# Patient Record
Sex: MALE | Race: WHITE | NOT HISPANIC OR LATINO
[De-identification: names, ages, dates, MRNs, and addresses within clinical notes are randomized per-mention and may not be internally consistent; named-entity substitution may affect disease eponyms.]

---

## 2018-07-16 ENCOUNTER — APPOINTMENT (OUTPATIENT)
Dept: OPHTHALMOLOGY | Facility: CLINIC | Age: 28
End: 2018-07-16
Payer: COMMERCIAL

## 2018-07-16 PROCEDURE — 92004 COMPRE OPH EXAM NEW PT 1/>: CPT

## 2018-07-16 PROCEDURE — 92285 EXTERNAL OCULAR PHOTOGRAPHY: CPT

## 2021-02-06 ENCOUNTER — INPATIENT (INPATIENT)
Facility: HOSPITAL | Age: 31
LOS: 4 days | Discharge: HOME | DRG: 896 | End: 2021-02-11
Attending: INTERNAL MEDICINE | Admitting: INTERNAL MEDICINE
Payer: COMMERCIAL

## 2021-02-06 VITALS
HEIGHT: 78 IN | SYSTOLIC BLOOD PRESSURE: 144 MMHG | RESPIRATION RATE: 16 BRPM | OXYGEN SATURATION: 98 % | WEIGHT: 199.96 LBS | TEMPERATURE: 99 F | HEART RATE: 96 BPM | DIASTOLIC BLOOD PRESSURE: 89 MMHG

## 2021-02-06 DIAGNOSIS — R41.82 ALTERED MENTAL STATUS, UNSPECIFIED: ICD-10-CM

## 2021-02-06 DIAGNOSIS — G93.40 ENCEPHALOPATHY, UNSPECIFIED: ICD-10-CM

## 2021-02-06 DIAGNOSIS — M51.26 OTHER INTERVERTEBRAL DISC DISPLACEMENT, LUMBAR REGION: Chronic | ICD-10-CM

## 2021-02-06 LAB
ALBUMIN SERPL ELPH-MCNC: 5.2 G/DL — HIGH (ref 3.3–5)
ALP SERPL-CCNC: 64 U/L — SIGNIFICANT CHANGE UP (ref 40–120)
ALT FLD-CCNC: 85 U/L — HIGH (ref 10–45)
AMPHET UR-MCNC: NEGATIVE — SIGNIFICANT CHANGE UP
ANION GAP SERPL CALC-SCNC: 19 MMOL/L — HIGH (ref 5–17)
APAP SERPL-MCNC: <15 UG/ML — SIGNIFICANT CHANGE UP (ref 10–30)
APPEARANCE CSF: CLEAR — SIGNIFICANT CHANGE UP
APPEARANCE SPUN FLD: COLORLESS — SIGNIFICANT CHANGE UP
APPEARANCE UR: CLEAR — SIGNIFICANT CHANGE UP
AST SERPL-CCNC: 37 U/L — SIGNIFICANT CHANGE UP (ref 10–40)
BARBITURATES UR SCN-MCNC: NEGATIVE — SIGNIFICANT CHANGE UP
BASOPHILS # BLD AUTO: 0.03 K/UL — SIGNIFICANT CHANGE UP (ref 0–0.2)
BASOPHILS NFR BLD AUTO: 0.3 % — SIGNIFICANT CHANGE UP (ref 0–2)
BENZODIAZ UR-MCNC: NEGATIVE — SIGNIFICANT CHANGE UP
BILIRUB SERPL-MCNC: 0.8 MG/DL — SIGNIFICANT CHANGE UP (ref 0.2–1.2)
BILIRUB UR-MCNC: NEGATIVE — SIGNIFICANT CHANGE UP
BUN SERPL-MCNC: 20 MG/DL — SIGNIFICANT CHANGE UP (ref 7–23)
CALCIUM SERPL-MCNC: 10.4 MG/DL — SIGNIFICANT CHANGE UP (ref 8.4–10.5)
CHLORIDE SERPL-SCNC: 102 MMOL/L — SIGNIFICANT CHANGE UP (ref 96–108)
CO2 SERPL-SCNC: 21 MMOL/L — LOW (ref 22–31)
COCAINE METAB.OTHER UR-MCNC: NEGATIVE — SIGNIFICANT CHANGE UP
COLOR CSF: SIGNIFICANT CHANGE UP
COLOR SPEC: YELLOW — SIGNIFICANT CHANGE UP
CREAT SERPL-MCNC: 0.87 MG/DL — SIGNIFICANT CHANGE UP (ref 0.5–1.3)
CSF PCR RESULT: SIGNIFICANT CHANGE UP
DIFF PNL FLD: NEGATIVE — SIGNIFICANT CHANGE UP
EOSINOPHIL # BLD AUTO: 0 K/UL — SIGNIFICANT CHANGE UP (ref 0–0.5)
EOSINOPHIL NFR BLD AUTO: 0 % — SIGNIFICANT CHANGE UP (ref 0–6)
ETHANOL SERPL-MCNC: SIGNIFICANT CHANGE UP MG/DL (ref 0–10)
GLUCOSE CSF-MCNC: 79 MG/DL — HIGH (ref 40–70)
GLUCOSE SERPL-MCNC: 116 MG/DL — HIGH (ref 70–99)
GLUCOSE UR QL: NEGATIVE — SIGNIFICANT CHANGE UP
GRAM STN FLD: SIGNIFICANT CHANGE UP
HCT VFR BLD CALC: 45.1 % — SIGNIFICANT CHANGE UP (ref 39–50)
HGB BLD-MCNC: 15.9 G/DL — SIGNIFICANT CHANGE UP (ref 13–17)
IMM GRANULOCYTES NFR BLD AUTO: 0.3 % — SIGNIFICANT CHANGE UP (ref 0–1.5)
KETONES UR-MCNC: ABNORMAL
LABORATORY COMMENT REPORT: SIGNIFICANT CHANGE UP
LDH CSF L TO P-CCNC: 19 U/L — SIGNIFICANT CHANGE UP
LDH FLD-CCNC: 19 U/L — SIGNIFICANT CHANGE UP
LEUKOCYTE ESTERASE UR-ACNC: NEGATIVE — SIGNIFICANT CHANGE UP
LYMPHOCYTES # BLD AUTO: 1.09 K/UL — SIGNIFICANT CHANGE UP (ref 1–3.3)
LYMPHOCYTES # BLD AUTO: 12.3 % — LOW (ref 13–44)
LYMPHOCYTES # CSF: 57 % — SIGNIFICANT CHANGE UP (ref 40–80)
MCHC RBC-ENTMCNC: 29.1 PG — SIGNIFICANT CHANGE UP (ref 27–34)
MCHC RBC-ENTMCNC: 35.3 GM/DL — SIGNIFICANT CHANGE UP (ref 32–36)
MCV RBC AUTO: 82.6 FL — SIGNIFICANT CHANGE UP (ref 80–100)
METHADONE UR-MCNC: NEGATIVE — SIGNIFICANT CHANGE UP
MONOCYTES # BLD AUTO: 0.71 K/UL — SIGNIFICANT CHANGE UP (ref 0–0.9)
MONOCYTES NFR BLD AUTO: 8 % — SIGNIFICANT CHANGE UP (ref 2–14)
MONOS+MACROS NFR CSF: 42 % — SIGNIFICANT CHANGE UP (ref 15–45)
NEUTROPHILS # BLD AUTO: 7.03 K/UL — SIGNIFICANT CHANGE UP (ref 1.8–7.4)
NEUTROPHILS # CSF: 1 % — SIGNIFICANT CHANGE UP (ref 0–6)
NEUTROPHILS NFR BLD AUTO: 79.1 % — HIGH (ref 43–77)
NITRITE UR-MCNC: NEGATIVE — SIGNIFICANT CHANGE UP
NRBC # BLD: 0 /100 WBCS — SIGNIFICANT CHANGE UP (ref 0–0)
NRBC NFR CSF: 2 /UL — SIGNIFICANT CHANGE UP (ref 0–5)
OPIATES UR-MCNC: POSITIVE
OXYCODONE UR-MCNC: NEGATIVE — SIGNIFICANT CHANGE UP
PCP SPEC-MCNC: SIGNIFICANT CHANGE UP
PCP UR-MCNC: NEGATIVE — SIGNIFICANT CHANGE UP
PH UR: 6.5 — SIGNIFICANT CHANGE UP (ref 5–8)
PLATELET # BLD AUTO: 294 K/UL — SIGNIFICANT CHANGE UP (ref 150–400)
POTASSIUM SERPL-MCNC: 3.7 MMOL/L — SIGNIFICANT CHANGE UP (ref 3.5–5.3)
POTASSIUM SERPL-SCNC: 3.7 MMOL/L — SIGNIFICANT CHANGE UP (ref 3.5–5.3)
PROT CSF-MCNC: 34 MG/DL — SIGNIFICANT CHANGE UP (ref 15–45)
PROT SERPL-MCNC: 8.5 G/DL — HIGH (ref 6–8.3)
PROT UR-MCNC: 100 — SIGNIFICANT CHANGE UP
RBC # BLD: 5.46 M/UL — SIGNIFICANT CHANGE UP (ref 4.2–5.8)
RBC # CSF: 0 /UL — SIGNIFICANT CHANGE UP (ref 0–0)
RBC # FLD: 12.8 % — SIGNIFICANT CHANGE UP (ref 10.3–14.5)
SALICYLATES SERPL-MCNC: <2 MG/DL — LOW (ref 15–30)
SARS-COV-2 RNA SPEC QL NAA+PROBE: SIGNIFICANT CHANGE UP
SODIUM SERPL-SCNC: 142 MMOL/L — SIGNIFICANT CHANGE UP (ref 135–145)
SOURCE HSV 1/2: SIGNIFICANT CHANGE UP
SP GR SPEC: 1.04 — HIGH (ref 1.01–1.02)
SPECIMEN SOURCE: SIGNIFICANT CHANGE UP
THC UR QL: POSITIVE
TSH SERPL-MCNC: 1.05 UIU/ML — SIGNIFICANT CHANGE UP (ref 0.27–4.2)
TUBE TYPE: SIGNIFICANT CHANGE UP
UROBILINOGEN FLD QL: ABNORMAL
WBC # BLD: 8.89 K/UL — SIGNIFICANT CHANGE UP (ref 3.8–10.5)
WBC # FLD AUTO: 8.89 K/UL — SIGNIFICANT CHANGE UP (ref 3.8–10.5)

## 2021-02-06 PROCEDURE — 71045 X-RAY EXAM CHEST 1 VIEW: CPT | Mod: 26

## 2021-02-06 PROCEDURE — 62270 DX LMBR SPI PNXR: CPT

## 2021-02-06 PROCEDURE — 99285 EMERGENCY DEPT VISIT HI MDM: CPT | Mod: 25

## 2021-02-06 PROCEDURE — 70450 CT HEAD/BRAIN W/O DYE: CPT | Mod: 26,MA

## 2021-02-06 RX ORDER — CEFTRIAXONE 500 MG/1
2000 INJECTION, POWDER, FOR SOLUTION INTRAMUSCULAR; INTRAVENOUS ONCE
Refills: 0 | Status: COMPLETED | OUTPATIENT
Start: 2021-02-06 | End: 2021-02-06

## 2021-02-06 RX ORDER — VANCOMYCIN HCL 1 G
1000 VIAL (EA) INTRAVENOUS ONCE
Refills: 0 | Status: COMPLETED | OUTPATIENT
Start: 2021-02-06 | End: 2021-02-06

## 2021-02-06 RX ORDER — ONDANSETRON 8 MG/1
4 TABLET, FILM COATED ORAL ONCE
Refills: 0 | Status: COMPLETED | OUTPATIENT
Start: 2021-02-06 | End: 2021-02-06

## 2021-02-06 RX ORDER — SODIUM CHLORIDE 9 MG/ML
1000 INJECTION INTRAMUSCULAR; INTRAVENOUS; SUBCUTANEOUS ONCE
Refills: 0 | Status: COMPLETED | OUTPATIENT
Start: 2021-02-06 | End: 2021-02-06

## 2021-02-06 RX ORDER — ACYCLOVIR SODIUM 500 MG
800 VIAL (EA) INTRAVENOUS ONCE
Refills: 0 | Status: COMPLETED | OUTPATIENT
Start: 2021-02-06 | End: 2021-02-06

## 2021-02-06 RX ORDER — BUPRENORPHINE AND NALOXONE 2; .5 MG/1; MG/1
1 TABLET SUBLINGUAL ONCE
Refills: 0 | Status: DISCONTINUED | OUTPATIENT
Start: 2021-02-06 | End: 2021-02-06

## 2021-02-06 RX ORDER — BUPRENORPHINE AND NALOXONE 2; .5 MG/1; MG/1
2 TABLET SUBLINGUAL ONCE
Refills: 0 | Status: DISCONTINUED | OUTPATIENT
Start: 2021-02-06 | End: 2021-02-06

## 2021-02-06 RX ORDER — ACETAMINOPHEN 500 MG
1000 TABLET ORAL ONCE
Refills: 0 | Status: COMPLETED | OUTPATIENT
Start: 2021-02-06 | End: 2021-02-06

## 2021-02-06 RX ADMIN — SODIUM CHLORIDE 1000 MILLILITER(S): 9 INJECTION INTRAMUSCULAR; INTRAVENOUS; SUBCUTANEOUS at 08:44

## 2021-02-06 RX ADMIN — SODIUM CHLORIDE 1000 MILLILITER(S): 9 INJECTION INTRAMUSCULAR; INTRAVENOUS; SUBCUTANEOUS at 10:18

## 2021-02-06 RX ADMIN — BUPRENORPHINE AND NALOXONE 2 TABLET(S): 2; .5 TABLET SUBLINGUAL at 12:40

## 2021-02-06 RX ADMIN — SODIUM CHLORIDE 1000 MILLILITER(S): 9 INJECTION INTRAMUSCULAR; INTRAVENOUS; SUBCUTANEOUS at 11:44

## 2021-02-06 RX ADMIN — Medication 0.1 MILLIGRAM(S): at 07:25

## 2021-02-06 RX ADMIN — SODIUM CHLORIDE 1000 MILLILITER(S): 9 INJECTION INTRAMUSCULAR; INTRAVENOUS; SUBCUTANEOUS at 19:30

## 2021-02-06 RX ADMIN — ONDANSETRON 4 MILLIGRAM(S): 8 TABLET, FILM COATED ORAL at 07:25

## 2021-02-06 RX ADMIN — SODIUM CHLORIDE 1000 MILLILITER(S): 9 INJECTION INTRAMUSCULAR; INTRAVENOUS; SUBCUTANEOUS at 14:54

## 2021-02-06 RX ADMIN — Medication 266 MILLIGRAM(S): at 19:55

## 2021-02-06 RX ADMIN — SODIUM CHLORIDE 1000 MILLILITER(S): 9 INJECTION INTRAMUSCULAR; INTRAVENOUS; SUBCUTANEOUS at 07:25

## 2021-02-06 RX ADMIN — BUPRENORPHINE AND NALOXONE 1 TABLET(S): 2; .5 TABLET SUBLINGUAL at 07:25

## 2021-02-06 RX ADMIN — Medication 250 MILLIGRAM(S): at 18:35

## 2021-02-06 RX ADMIN — BUPRENORPHINE AND NALOXONE 2 TABLET(S): 2; .5 TABLET SUBLINGUAL at 10:39

## 2021-02-06 RX ADMIN — ONDANSETRON 4 MILLIGRAM(S): 8 TABLET, FILM COATED ORAL at 10:18

## 2021-02-06 RX ADMIN — CEFTRIAXONE 100 MILLIGRAM(S): 500 INJECTION, POWDER, FOR SOLUTION INTRAMUSCULAR; INTRAVENOUS at 14:54

## 2021-02-06 RX ADMIN — Medication 400 MILLIGRAM(S): at 19:47

## 2021-02-06 RX ADMIN — BUPRENORPHINE AND NALOXONE 2 TABLET(S): 2; .5 TABLET SUBLINGUAL at 14:54

## 2021-02-06 NOTE — ED PROVIDER NOTE - CARE PLAN
Principal Discharge DX:	Opioid withdrawal   Principal Discharge DX:	Acute encephalopathy  Secondary Diagnosis:	Fever, unspecified fever cause

## 2021-02-06 NOTE — CONSULT NOTE ADULT - SUBJECTIVE AND OBJECTIVE BOX
MEDICAL TOXICOLOGY CONSULT    HPI: 30yo M pmhx opioid use disorder p/w multiple complaints including intermittent SOB, nausea/vomiting and intermittent memory issues, pt. reports last opioid use was "a couple of days ago" per primary team, patient concerned for infection with COVID-19 but recently tested negative. Toxicology initially consulted for opioid withdrawal s/p 2mg buprenorphine with little improvement, however after escalating treatment with suboxone patient remains symptomatic and intermittently confused. Now with rectal temp to 101.      PAST MEDICAL & SURGICAL HISTORY:  Polysubstance (including opioids) dependence with physiological dependence    L4-L5 disc bulge        MEDICATION HISTORY:  acyclovir IVPB 800 milliGRAM(s) IV Intermittent once  vancomycin  IVPB. 1000 milliGRAM(s) IV Intermittent once      FAMILY HISTORY:      REVIEW OF SYSTEMS: Nausea and vomiting.      Vital Signs Last 24 Hrs  T(C): 38.3 (06 Feb 2021 14:30), Max: 38.3 (06 Feb 2021 14:30)  T(F): 101 (06 Feb 2021 14:30), Max: 101 (06 Feb 2021 14:30)  HR: 64 (06 Feb 2021 14:54) (64 - 98)  BP: 134/88 (06 Feb 2021 14:54) (122/71 - 144/89)  BP(mean): --  RR: 16 (06 Feb 2021 14:54) (16 - 19)  SpO2: 97% (06 Feb 2021 14:54) (96% - 100%)    SIGNIFICANT LABORATORY STUDIES:                        15.9   8.89  )-----------( 294      ( 06 Feb 2021 07:32 )             45.1       02-06    142  |  102  |  20  ----------------------------<  116<H>  3.7   |  21<L>  |  0.87    Ca    10.4      06 Feb 2021 07:32    TPro  8.5<H>  /  Alb  5.2<H>  /  TBili  0.8  /  DBili  x   /  AST  37  /  ALT  85<H>  /  AlkPhos  64  02-06        Anion Gap: -- 02-06 @ 10:37  CK: -- 02-06 @ 10:37  Troponin:  --  02-06 @ 10:37  Pro-BNP:  --  02-06 @ 10:37  VBG:  --  02-06 @ 10:37  Carboxyhemoglobin %:  --  02-06 @ 10:37  Methemoglobin %:  --  02-06 @ 10:37  Osmolality Serum:  --  02-06 @ 10:37  Aspirin Level: <2.0<L>  02-06 @ 10:37  Acetaminophen Level:  <15  02-06 @ 10:37  Ethanol Level:  --  02-06 @ 10:37  Digoxin Level:  --  02-06 @ 10:37  Phenytoin Level:  --  02-06 @ 10:37  Carbamazepine level:  --  02-06 @ 10:37  Lamotrigine level:  --  02-06 @ 10:37  Anion Gap: 19<H> 02-06 @ 07:32  CK: -- 02-06 @ 07:32  Troponin:  --  02-06 @ 07:32  Pro-BNP:  --  02-06 @ 07:32  VBG:  --  02-06 @ 07:32  Carboxyhemoglobin %:  --  02-06 @ 07:32  Methemoglobin %:  --  02-06 @ 07:32  Osmolality Serum:  --  02-06 @ 07:32  Aspirin Level: --  02-06 @ 07:32  Acetaminophen Level:  --  02-06 @ 07:32  Ethanol Level:  --  02-06 @ 07:32  Digoxin Level:  --  02-06 @ 07:32  Phenytoin Level:  --  02-06 @ 07:32  Carbamazepine level:  --  02-06 @ 07:32  Lamotrigine level:  --  02-06 @ 07:32

## 2021-02-06 NOTE — ED ADULT NURSE REASSESSMENT NOTE - NS ED NURSE REASSESS COMMENT FT1
Pt is altered MD Farrell aware, pt speaking but does not make sense. MD Farrell aware. pt actively vomiting, zofran given pt cleaned and given new gown. VSS and FS done. Pt placed on cardiac monitor

## 2021-02-06 NOTE — ED ADULT NURSE NOTE - CHIEF COMPLAINT QUOTE
Bilateral soft wrist restraints ordered for patient safety  Danita Nyhan, MD  6:14 AM c/o headaches, intermittent shortness of breath and new onset of short term memory loss; concerned for covid but rapid covid test at  negative yesterday

## 2021-02-06 NOTE — ED PROVIDER NOTE - ATTENDING CONTRIBUTION TO CARE
30y/o M w/ h/o polysubstance abuse p/w short term memory loss with sts consistent with opiate withdrawal, vss, tremulous, gi sts, myalgia, wants a covid test, SW consult, labs, meds, reassess, signed out pending work up.

## 2021-02-06 NOTE — H&P ADULT - HISTORY OF PRESENT ILLNESS
30yo M pmhx opioid use disorder p/w multiple complaints including intermittent SOB, nausea/vomiting and intermittent memory issues, pt. reports last opioid use was "a couple of days ago" per primary team, patient concerned for infection with COVID-19 but recently tested negative. pt. was thought for opioid withdrawal s/p 2mg buprenorphine with little improvement, however after escalating treatment with suboxone patient remains symptomatic and intermittently confused. Now with rectal temp to 101.    d/w mother and sister over phone to obtain history : extensive Hx of poslysubstance abuse , marijuana , cocaine , heroine , he was on subxaone for 2 years and in Dec'2019 has heroine overdose .   family thinks that he still use opioids/ ativan and marijuana , however they didn't find any in his room. he had back surgeries x 2 in past     pt. is lethagic and confused

## 2021-02-06 NOTE — H&P ADULT - ATTENDING COMMENTS
if pt. continue to be confused and having vomiting then may need NGT  consult house ID in am     d/w mother / sister over phone

## 2021-02-06 NOTE — SBIRT NOTE ADULT - NSSBIRTDRGBRIEFINTDET_GEN_A_CORE
Patient endorses snorting 3-4 bags of heroin daily for the past year. He reports past IVDU and polysubstance use, but only snorting heroin for the last year. Patient reports attending outpatient treatment in the past.

## 2021-02-06 NOTE — ED PROVIDER NOTE - PROGRESS NOTE DETAILS
Resident Akira: pt received as a signout  at7a - labs/CT are pending. If labs are non actionable and no new complains, plan to discuss case with SW for any detox options available and dc home. Attending Aria Boyd: d/w tox recommends adding on additional suboxone. Resident Akira: Pt re-examined at 9:45 noted to be vomiting. Answering most of my questions (name, place and date). Appears drowsy and diaphoretic. Denies any pain. EKG, zofran, suboxone, EToh, tylenol and salicylate levels ordered in case pt needs to be admitted. Pt is ok with me sharing all of his health information with his mother including his drug use. Resident Akira: spoke with mom over the phone who is concerned that patient is hallucinating - States that patient had told her some people called him multiple time and told him things that are not true. Mental status changes between minutes where he is telling her things that are not true and then back to baseline. He told her : "my sister-in-law called him and told him that his dad " but he did not, with few other similar incidents. Never had a fever or rash. Has been depressed over past fews, but never expressed any thoughts of harming himself or others.  Pt states he last used heroin 2-3 weeks ago. Denies using any other drugs in past 2 weeks, including alcohol, cocaine, PCP. Pt's mental status changes intermittently. He answers questions appropriately and then stops. Resident Akira: spoke with mom over the phone who is concerned that patient is hallucinating - States that patient had told her some people called him multiple time and told him things that are not true. Mental status changes between minutes where he is telling her things that are not true and then back to baseline. He told her : "my sister-in-law called him and told him that his dad " but he did not, with few other similar incidents. Never had a fever or rash. Has been depressed over past fews, but never expressed any thoughts of harming himself or others.  Pt states he last used heroin 2-3 weeks ago. Denies using any other drugs in past 2 weeks, including alcohol, cocaine, PCP.   Non diaphoretic, PERRLA, denies any pain. Needs to be prompted repeatedly to answer questions - answers questions appropriately however. Resident Hersimran: found to be vomiting again. Pt was sitting up - low risk of aspiration. Discussed case with tox again over the phone - can give another dose of suboxone 4mg. maximum dose of 16mg total. non diaphoretic, no tremors, arousable with verbal prompt endorsed to me. treating opiate w/d but pt still concerning for psychosis so will consult psychiatry -Derrick Boyd MD- Resident Akira: pt is vomiting again. Following commands. Sitting up. Diaphoretic. Will re-dose suboxone. IVF. mental status waxing and waning but now febrile. consider lp for enephalitis. abx. probable admit. not meningitic however. -Derrick Boyd MD- Resident Akira: LP was performed with over the phone consent obtained from mother by Dr. Derrick Boyd. Resident Hersimran: pt is responsive to verbal stimuli, denies any pain. States no complains at this time.

## 2021-02-06 NOTE — SBIRT NOTE ADULT - NSSBIRTDRGPASSREFTXDET_GEN_A_CORE
He reports that he can return to treatment with his former therapist and follow up with his PMD to resume suboxone. Patient reports he will coordinate appointments when discharge date is known. Patient accepted additional resources to bring home.

## 2021-02-06 NOTE — ED PROCEDURE NOTE - CPROC ED INFORMED CONSENT1
pt's mother. rn and res witness/Benefits, risks, and possible complications of procedure explained to patient/caregiver who verbalized understanding and gave verbal consent.

## 2021-02-06 NOTE — CONSULT NOTE ADULT - ASSESSMENT
32yo M pmhx opioid use disorder p/w multiple complaints, toxicology initially consulted via phone for opioid withdrawal s/p 2mg buprenorphine with little improvement, and after escalating treatment with suboxone patient remains symptomatic and intermittently confused -- which is not consistent with acute opioid withdrawal. Now with rectal temp to 101.    - Acute opioid withdrawal much less likely, consider other/infectious causes  - Acute ingestion/other toxicologic etiology also less likely, pt. does not appear to exhibit other signs/symptoms of clear cut toxidrome   - Continue supportive care and close monitoring of mental status  - If patient becomes increasingly hyperthermic, initiate cooling measures and notify the Tox service  - Rest of work up per primary team    Please page our service with any questions/concerns/updates [phone]   Thank you for the consult

## 2021-02-06 NOTE — ED ADULT NURSE NOTE - OBJECTIVE STATEMENT
Patient presented to ED from triage c/o body aches, tremors, sleepiness, confusion, "cloudy". Symptoms started on Tuesday at work where he was falling sleep frequently and waking up confused and "cloudy". Patient did heroine (snoring)  2 days ago. He has been using it for about a year. Patient jittery and slight hands tremors noted on hands in ED, but pleasant and cooperative. Patient presented to ED from triage c/o body aches, tremors, sleepiness, confusion, "cloudy". Symptoms started on Tuesday at work where he was falling sleep frequently and waking up confused and "cloudy". Patient did heroine (snorted)  2 days ago. He has been using it for about a year. Patient jittery and slight hands tremors noted on hands in ED, but pleasant and cooperative.

## 2021-02-06 NOTE — ED PROVIDER NOTE - CLINICAL SUMMARY MEDICAL DECISION MAKING FREE TEXT BOX
32y/o M w/ h/o polysubstance abuse p/w multiple complaints concern for opioid withdrawal. will give clonidine and Suboxone. basic labs, CTH and social work for rehab. 30y/o M w/ h/o polysubstance abuse p/w multiple complaints concern for opioid withdrawal. COWS score 22. Will give clonidine and Suboxone. basic labs, CTH and social work for rehab.

## 2021-02-06 NOTE — ED PROVIDER NOTE - CONSTITUTIONAL, MLM
normal... Very anxious appearing fidgeting cannot sit still, awake, alert, oriented to person, place, time/situation and in no apparent distress.

## 2021-02-06 NOTE — ED PROVIDER NOTE - OBJECTIVE STATEMENT
30y/o M w/ h/o polysubstance abuse p/w short term memory loss. Pt states that he snorts heroin (no IVDA) last time using was 2-3d ago. Now very anxious, believes he had COVID, was tested x2 both negative. When asked about memory loss pt unable to provide pertinent history to question. Denies fevers, chills, chest pain, cough, sob, abdominal pain, back pain, dysuria, numbness, tingling, EtOH.    Collateral obtained from mother states that son has been repeating questions and acting abnormally. He had multiple episodes of nausea and vomiting yesterday.    Mother More [phone]

## 2021-02-06 NOTE — H&P ADULT - NSHPPHYSICALEXAM_GEN_ALL_CORE
pt. seen and examined , lethargic / confused     Vital Signs Last 24 Hrs  T(C): 37.7 (07 Feb 2021 03:00), Max: 38.5 (06 Feb 2021 19:00)  T(F): 99.9 (07 Feb 2021 03:00), Max: 101.3 (06 Feb 2021 19:00)  HR: 62 (07 Feb 2021 01:15) (53 - 98)  BP: 143/89 (07 Feb 2021 01:08) (122/71 - 162/75)  BP(mean): --  RR: 18 (07 Feb 2021 01:08) (15 - 19)  SpO2: 97% (07 Feb 2021 01:08) (96% - 100%)    heent: nc/at   neck: supple, no JVD  Lungs: B/L fair A/E, no w/r/r  heart: s1s2 nml  abd: soft, NABS  ext: no e/c/c, pulses 2+  neuro: aaox0 lethargic and confused

## 2021-02-06 NOTE — H&P ADULT - NSHPLABSRESULTS_GEN_ALL_CORE
15.9   8.89  )-----------( 294      ( 06 Feb 2021 07:32 )             45.1     02-06    142  |  102  |  20  ----------------------------<  116<H>  3.7   |  21<L>  |  0.87    Ca    10.4      06 Feb 2021 07:32    TPro  8.5<H>  /  Alb  5.2<H>  /  TBili  0.8  /  DBili  x   /  AST  37  /  ALT  85<H>  /  AlkPhos  64  02-06

## 2021-02-06 NOTE — ED ADULT NURSE REASSESSMENT NOTE - NS ED NURSE REASSESS COMMENT FT1
Cesar catheter inserted using sterile technique. Second RN Zohreh present to confirm sterility. Bedside drainage to gravity. Stat lock in place. Pt remains lethargic and altered. VSS

## 2021-02-06 NOTE — CHART NOTE - NSCHARTNOTEFT_GEN_A_CORE
EMERGENCY : LMSW consulted by MD Farrell due to plans for patient to be discharged pending lab results and expressing interest in outpatient treatment options for polysubstance use. LMSW reviewed patient's chart. As per chart review patient is a "32y/o M w/ h/o polysubstance abuse p/w short term memory loss. Pt states that he snorts heroin (no IVDA) last time using was 2-3d ago. Now very anxious, believes he had COVID, was tested x2 both negative." LMSW attempted to meet with patient at bedside for assessment and intervention but was informed by RN that patient decompensated and has become altered and began vomiting in ed. As per MD patient to be admitted. Social work team to continue to follow through admission. Social work remains available for any needs.

## 2021-02-06 NOTE — ED ADULT TRIAGE NOTE - CHIEF COMPLAINT QUOTE
c/o headaches, intermittent shortness of breath and new onset of short term memory loss; concerned for covid but rapid covid test at  negative yesterday

## 2021-02-06 NOTE — ED PROCEDURE NOTE - PROCEDURE ADDITIONAL DETAILS
Emergency Department Focused Ultrasound performed at patient's bedside for educational purposes. The study was performed to evaluate  bladder volume only.

## 2021-02-06 NOTE — ED ADULT NURSE REASSESSMENT NOTE - NS ED NURSE REASSESS COMMENT FT1
Report received from Shira AYALA. Patient laying in bed, no visible signs of distress. Waiting for transport.

## 2021-02-06 NOTE — H&P ADULT - NSICDXPASTMEDICALHX_GEN_ALL_CORE_FT
PAST MEDICAL HISTORY:  Polysubstance (including opioids) dependence with physiological dependence

## 2021-02-06 NOTE — ED ADULT NURSE NOTE - NSFALLRSKASSESSTYPE_ED_ALL_ED
01/04/20 0900   Daily Treatment   Symptoms Review Activity Group 0900 - 1000   Affect Sad;Anxious   Mood Anxious   Thought Process Ruminating   Psychosis None   Type of Hallucinations None identified   Symptom Notation Carol reported feeling \"all right..a little tired\". She stated she took a 5 hour nap after group yesterday and struggled to sleep at night - finally fell asleep about 5:00am.   Psychosocial Stressors Academic/Career concerns;Interpersonal conflict;Loss / Grief   Sleep Report Poor   Hours Slept 2-3 hours this AM, 5 hour nap yesterday   Meals Other (comment)  (Salad at 3:00am)   Goal Complete / Activity Carol reported she found a frame for her drawing.   Treatment Plan Continue treatment plan   Patient Response Attentive;Good eye contact   Comment Carol completed symptom rating sheet.   RN Monitoring of Pain, Safety, and Relapse   Safety Concerns Suicidal ideation;Urge to harm self   Types of Safety Concerns SI:9/10; UHS:10/10   Physical Concerns 5/10   Pain Concerns 5/10   Use of Street Drugs or Alcohol No   Taking Medications as Prescribed No  (Did not take AM, switched med to PM)   Number in group: 12      PHILIP Mccabe     Initial (On Arrival)

## 2021-02-07 DIAGNOSIS — F19.20 OTHER PSYCHOACTIVE SUBSTANCE DEPENDENCE, UNCOMPLICATED: ICD-10-CM

## 2021-02-07 DIAGNOSIS — G93.40 ENCEPHALOPATHY, UNSPECIFIED: ICD-10-CM

## 2021-02-07 DIAGNOSIS — R41.0 DISORIENTATION, UNSPECIFIED: ICD-10-CM

## 2021-02-07 DIAGNOSIS — G03.9 MENINGITIS, UNSPECIFIED: ICD-10-CM

## 2021-02-07 LAB
ALBUMIN SERPL ELPH-MCNC: 4.2 G/DL — SIGNIFICANT CHANGE UP (ref 3.3–5)
ALBUMIN SERPL ELPH-MCNC: 4.3 G/DL — SIGNIFICANT CHANGE UP (ref 3.3–5)
ALP SERPL-CCNC: 53 U/L — SIGNIFICANT CHANGE UP (ref 40–120)
ALP SERPL-CCNC: 53 U/L — SIGNIFICANT CHANGE UP (ref 40–120)
ALT FLD-CCNC: 50 U/L — HIGH (ref 10–45)
ALT FLD-CCNC: 50 U/L — HIGH (ref 10–45)
ANION GAP SERPL CALC-SCNC: 11 MMOL/L — SIGNIFICANT CHANGE UP (ref 5–17)
ANION GAP SERPL CALC-SCNC: 13 MMOL/L — SIGNIFICANT CHANGE UP (ref 5–17)
AST SERPL-CCNC: 23 U/L — SIGNIFICANT CHANGE UP (ref 10–40)
AST SERPL-CCNC: 25 U/L — SIGNIFICANT CHANGE UP (ref 10–40)
BILIRUB DIRECT SERPL-MCNC: 0.1 MG/DL — SIGNIFICANT CHANGE UP (ref 0–0.2)
BILIRUB INDIRECT FLD-MCNC: 0.5 MG/DL — SIGNIFICANT CHANGE UP (ref 0.2–1)
BILIRUB SERPL-MCNC: 0.6 MG/DL — SIGNIFICANT CHANGE UP (ref 0.2–1.2)
BILIRUB SERPL-MCNC: 0.6 MG/DL — SIGNIFICANT CHANGE UP (ref 0.2–1.2)
BUN SERPL-MCNC: 10 MG/DL — SIGNIFICANT CHANGE UP (ref 7–23)
BUN SERPL-MCNC: 13 MG/DL — SIGNIFICANT CHANGE UP (ref 7–23)
CALCIUM SERPL-MCNC: 8.9 MG/DL — SIGNIFICANT CHANGE UP (ref 8.4–10.5)
CALCIUM SERPL-MCNC: 9.3 MG/DL — SIGNIFICANT CHANGE UP (ref 8.4–10.5)
CHLORIDE SERPL-SCNC: 105 MMOL/L — SIGNIFICANT CHANGE UP (ref 96–108)
CHLORIDE SERPL-SCNC: 105 MMOL/L — SIGNIFICANT CHANGE UP (ref 96–108)
CO2 SERPL-SCNC: 23 MMOL/L — SIGNIFICANT CHANGE UP (ref 22–31)
CO2 SERPL-SCNC: 25 MMOL/L — SIGNIFICANT CHANGE UP (ref 22–31)
CREAT SERPL-MCNC: 0.83 MG/DL — SIGNIFICANT CHANGE UP (ref 0.5–1.3)
CREAT SERPL-MCNC: 0.83 MG/DL — SIGNIFICANT CHANGE UP (ref 0.5–1.3)
CULTURE RESULTS: NO GROWTH — SIGNIFICANT CHANGE UP
GAS PNL BLDA: SIGNIFICANT CHANGE UP
GLUCOSE SERPL-MCNC: 114 MG/DL — HIGH (ref 70–99)
GLUCOSE SERPL-MCNC: 116 MG/DL — HIGH (ref 70–99)
HCT VFR BLD CALC: 39 % — SIGNIFICANT CHANGE UP (ref 39–50)
HGB BLD-MCNC: 13.8 G/DL — SIGNIFICANT CHANGE UP (ref 13–17)
MAGNESIUM SERPL-MCNC: 2.3 MG/DL — SIGNIFICANT CHANGE UP (ref 1.6–2.6)
MCHC RBC-ENTMCNC: 29.5 PG — SIGNIFICANT CHANGE UP (ref 27–34)
MCHC RBC-ENTMCNC: 35.4 GM/DL — SIGNIFICANT CHANGE UP (ref 32–36)
MCV RBC AUTO: 83.3 FL — SIGNIFICANT CHANGE UP (ref 80–100)
NRBC # BLD: 0 /100 WBCS — SIGNIFICANT CHANGE UP (ref 0–0)
PHOSPHATE SERPL-MCNC: 2.1 MG/DL — LOW (ref 2.5–4.5)
PLATELET # BLD AUTO: 260 K/UL — SIGNIFICANT CHANGE UP (ref 150–400)
POTASSIUM SERPL-MCNC: 2.9 MMOL/L — CRITICAL LOW (ref 3.5–5.3)
POTASSIUM SERPL-MCNC: 3.2 MMOL/L — LOW (ref 3.5–5.3)
POTASSIUM SERPL-SCNC: 2.9 MMOL/L — CRITICAL LOW (ref 3.5–5.3)
POTASSIUM SERPL-SCNC: 3.2 MMOL/L — LOW (ref 3.5–5.3)
PROT SERPL-MCNC: 6.8 G/DL — SIGNIFICANT CHANGE UP (ref 6–8.3)
PROT SERPL-MCNC: 7.3 G/DL — SIGNIFICANT CHANGE UP (ref 6–8.3)
RBC # BLD: 4.68 M/UL — SIGNIFICANT CHANGE UP (ref 4.2–5.8)
RBC # FLD: 12.9 % — SIGNIFICANT CHANGE UP (ref 10.3–14.5)
SODIUM SERPL-SCNC: 141 MMOL/L — SIGNIFICANT CHANGE UP (ref 135–145)
SODIUM SERPL-SCNC: 141 MMOL/L — SIGNIFICANT CHANGE UP (ref 135–145)
SPECIMEN SOURCE: SIGNIFICANT CHANGE UP
WBC # BLD: 8.36 K/UL — SIGNIFICANT CHANGE UP (ref 3.8–10.5)
WBC # FLD AUTO: 8.36 K/UL — SIGNIFICANT CHANGE UP (ref 3.8–10.5)

## 2021-02-07 PROCEDURE — 95720 EEG PHY/QHP EA INCR W/VEEG: CPT

## 2021-02-07 PROCEDURE — 90792 PSYCH DIAG EVAL W/MED SRVCS: CPT

## 2021-02-07 PROCEDURE — 99223 1ST HOSP IP/OBS HIGH 75: CPT

## 2021-02-07 PROCEDURE — 99222 1ST HOSP IP/OBS MODERATE 55: CPT | Mod: GC

## 2021-02-07 RX ORDER — ACETAMINOPHEN 500 MG
650 TABLET ORAL ONCE
Refills: 0 | Status: COMPLETED | OUTPATIENT
Start: 2021-02-07 | End: 2021-02-07

## 2021-02-07 RX ORDER — CLONAZEPAM 1 MG
1 TABLET ORAL ONCE
Refills: 0 | Status: DISCONTINUED | OUTPATIENT
Start: 2021-02-07 | End: 2021-02-07

## 2021-02-07 RX ORDER — PANTOPRAZOLE SODIUM 20 MG/1
40 TABLET, DELAYED RELEASE ORAL EVERY 12 HOURS
Refills: 0 | Status: DISCONTINUED | OUTPATIENT
Start: 2021-02-07 | End: 2021-02-10

## 2021-02-07 RX ORDER — POTASSIUM CHLORIDE 20 MEQ
10 PACKET (EA) ORAL
Refills: 0 | Status: COMPLETED | OUTPATIENT
Start: 2021-02-07 | End: 2021-02-07

## 2021-02-07 RX ORDER — CLONAZEPAM 1 MG
1 TABLET ORAL
Refills: 0 | Status: DISCONTINUED | OUTPATIENT
Start: 2021-02-08 | End: 2021-02-08

## 2021-02-07 RX ORDER — ACYCLOVIR SODIUM 500 MG
800 VIAL (EA) INTRAVENOUS EVERY 8 HOURS
Refills: 0 | Status: DISCONTINUED | OUTPATIENT
Start: 2021-02-07 | End: 2021-02-07

## 2021-02-07 RX ORDER — ONDANSETRON 8 MG/1
4 TABLET, FILM COATED ORAL EVERY 4 HOURS
Refills: 0 | Status: DISCONTINUED | OUTPATIENT
Start: 2021-02-07 | End: 2021-02-11

## 2021-02-07 RX ORDER — CEFTRIAXONE 500 MG/1
2000 INJECTION, POWDER, FOR SOLUTION INTRAMUSCULAR; INTRAVENOUS EVERY 12 HOURS
Refills: 0 | Status: DISCONTINUED | OUTPATIENT
Start: 2021-02-07 | End: 2021-02-07

## 2021-02-07 RX ORDER — SODIUM CHLORIDE 9 MG/ML
1000 INJECTION, SOLUTION INTRAVENOUS
Refills: 0 | Status: DISCONTINUED | OUTPATIENT
Start: 2021-02-07 | End: 2021-02-11

## 2021-02-07 RX ORDER — HEPARIN SODIUM 5000 [USP'U]/ML
5000 INJECTION INTRAVENOUS; SUBCUTANEOUS EVERY 8 HOURS
Refills: 0 | Status: DISCONTINUED | OUTPATIENT
Start: 2021-02-07 | End: 2021-02-11

## 2021-02-07 RX ORDER — VANCOMYCIN HCL 1 G
1000 VIAL (EA) INTRAVENOUS EVERY 12 HOURS
Refills: 0 | Status: DISCONTINUED | OUTPATIENT
Start: 2021-02-07 | End: 2021-02-08

## 2021-02-07 RX ORDER — CEFTRIAXONE 500 MG/1
1000 INJECTION, POWDER, FOR SOLUTION INTRAMUSCULAR; INTRAVENOUS EVERY 24 HOURS
Refills: 0 | Status: DISCONTINUED | OUTPATIENT
Start: 2021-02-08 | End: 2021-02-08

## 2021-02-07 RX ADMIN — Medication 116 MILLIGRAM(S): at 03:59

## 2021-02-07 RX ADMIN — Medication 100 MILLIEQUIVALENT(S): at 14:31

## 2021-02-07 RX ADMIN — Medication 650 MILLIGRAM(S): at 22:52

## 2021-02-07 RX ADMIN — ONDANSETRON 4 MILLIGRAM(S): 8 TABLET, FILM COATED ORAL at 05:15

## 2021-02-07 RX ADMIN — Medication 100 MILLIEQUIVALENT(S): at 23:20

## 2021-02-07 RX ADMIN — Medication 100 MILLIEQUIVALENT(S): at 13:04

## 2021-02-07 RX ADMIN — Medication 250 MILLIGRAM(S): at 18:33

## 2021-02-07 RX ADMIN — PANTOPRAZOLE SODIUM 40 MILLIGRAM(S): 20 TABLET, DELAYED RELEASE ORAL at 18:33

## 2021-02-07 RX ADMIN — HEPARIN SODIUM 5000 UNIT(S): 5000 INJECTION INTRAVENOUS; SUBCUTANEOUS at 05:18

## 2021-02-07 RX ADMIN — Medication 260 MILLIGRAM(S): at 02:17

## 2021-02-07 RX ADMIN — Medication 1 MILLIGRAM(S): at 21:08

## 2021-02-07 RX ADMIN — SODIUM CHLORIDE 100 MILLILITER(S): 9 INJECTION, SOLUTION INTRAVENOUS at 01:28

## 2021-02-07 RX ADMIN — Medication 100 MILLIEQUIVALENT(S): at 15:59

## 2021-02-07 RX ADMIN — HEPARIN SODIUM 5000 UNIT(S): 5000 INJECTION INTRAVENOUS; SUBCUTANEOUS at 14:36

## 2021-02-07 RX ADMIN — Medication 100 MILLIEQUIVALENT(S): at 22:11

## 2021-02-07 RX ADMIN — HEPARIN SODIUM 5000 UNIT(S): 5000 INJECTION INTRAVENOUS; SUBCUTANEOUS at 21:08

## 2021-02-07 RX ADMIN — Medication 2 MILLIGRAM(S): at 18:12

## 2021-02-07 RX ADMIN — PANTOPRAZOLE SODIUM 40 MILLIGRAM(S): 20 TABLET, DELAYED RELEASE ORAL at 05:15

## 2021-02-07 RX ADMIN — Medication 116 MILLIGRAM(S): at 12:36

## 2021-02-07 RX ADMIN — Medication 250 MILLIGRAM(S): at 05:14

## 2021-02-07 RX ADMIN — CEFTRIAXONE 100 MILLIGRAM(S): 500 INJECTION, POWDER, FOR SOLUTION INTRAMUSCULAR; INTRAVENOUS at 03:12

## 2021-02-07 RX ADMIN — SODIUM CHLORIDE 100 MILLILITER(S): 9 INJECTION, SOLUTION INTRAVENOUS at 18:29

## 2021-02-07 NOTE — BEHAVIORAL HEALTH ASSESSMENT NOTE - RISK ASSESSMENT
Unable to determine Suicide Risk to be determined once Patient is able to tolerate a full psych interview

## 2021-02-07 NOTE — BEHAVIORAL HEALTH ASSESSMENT NOTE - OTHER
n/a see HPI subsequent factors not able to determine at this time due to limitation of exam not able to determine at this time due to limitation of exam

## 2021-02-07 NOTE — CHART NOTE - NSCHARTNOTEFT_GEN_A_CORE
Lester Delatorre   MRN: [id number]    Notified by RN patient with temperature 100.6 F. Fever is downtrending from earlier. Pt seen and examined at bedside, unable to obtain ROS due to baseline status.     VITAL SIGNS:  T(C): 38.1 (02-07-21 @ 01:08), Max: 38.5 (02-06-21 @ 19:00)  HR: 53 (02-07-21 @ 01:08) (53 - 98)  BP: 143/89 (02-07-21 @ 01:08) (122/71 - 162/75)  RR: 18 (02-07-21 @ 01:08) (15 - 19)  SpO2: 97% (02-07-21 @ 01:08) (96% - 100%)  Wt(kg): --      MICROBIOLOGY:   BCx x2 from 2/6 pending   UCx from 2/6 pending     Urinalysis (02.06.21 @ 16:40)   pH Urine: 6.5   Glucose Qualitative, Urine: Negative   Blood, Urine: Negative   Color: Yellow   Urine Appearance: Clear   Bilirubin: Negative   Ketone - Urine: Moderate   Specific Gravity: 1.042   Protein, Urine: 100   Urobilinogen: 3 mg/dL   Nitrite: Negative   Leukocyte Esterase Concentration: Negative       RADIOLOGY:  CXR from 2/6- pending     ANTIBIOTICS  Acyclovir   Ceftriaxone   Vancomycin     PHYSICAL EXAM:    Constitutional: NAD  Respiratory: clear lungs bilaterally. No wheezing, rhonchi, or crackles.  Cardiovascular: S1 S2. No murmurs.  Gastrointestinal: BS X4 active. soft. nontender.  Extremities/Vascular: +2 pulses bilaterally. No BLE edema.      ASSESSMENT/PLAN:   HPI:  30 y/o M with PMHx of Polysubstance abuse p/w short term memory loss. Now presents with fever, improved from previous temp in the ED.       IMPRESSION: Fever  -Tylenol and cooling measures prn for pyrexia  -BC x2,- pending, will endorse to the primary team to f/u and manage   - UC- pending, will endorse to the primary team to f/u and manage   - UA- results reviewed   -CXR- 2/6 pending, will endorse to the primary team to f/u and manage   - Continue current antimicrobials and monitor for improvement in vitals   - follow up with primary team in AM   - RN aware of management     Ashia Ortega PA-C   Dept of Medicine   [id number]

## 2021-02-07 NOTE — BEHAVIORAL HEALTH ASSESSMENT NOTE - NSBHCHARTREVIEWLAB_PSY_A_CORE FT
02-07    141  |  105  |  13  ----------------------------<  116<H>  2.9<LL>   |  23  |  0.83    Ca    9.3      07 Feb 2021 11:41    TPro  6.8  /  Alb  4.3  /  TBili  0.6  /  DBili  x   /  AST  23  /  ALT  50<H>  /  AlkPhos  53  02-07

## 2021-02-07 NOTE — CONSULT NOTE ADULT - ASSESSMENT
30yo M pmhx opioid use disorder p/w multiple complaints including intermittent SOB, nausea/vomiting and intermittent memory issues, pt. reports last opioid use was "a couple of days ago" per primary team, patient concerned for infection with COVID-19 but recently tested negative. pt. was thought for opioid withdrawal s/p 2mg buprenorphine with little improvement, however after escalating treatment with suboxone patient remains symptomatic and intermittently confused. Now with rectal temp to 101.  when seen in AMS  LP CSF parameters not s/o infection  UA   CXR egative  Blood cx pending    No further higher fevers  No leucocytosis    ? Fevers from withdrawl  less likely occult infection  No s/s CNS infection on CSF  REc:  1) follow Cx  2) Psych followup on heroin /opioid abuse  monitor for withdrawl  3) For now continue empiric vanco and ceftraioxone pedning cultures  4) If further fevers check CT chest abd pelvis  5) DC acyclovir    Will tailor plan for ID issues  per course,results.Will defer to primary team on management of other issues.  Assessment, plan and recommendations as detailed above were discussed with the medical/primary  team.  Will Follow.  Beeper [phone] [facility] [id number].   Wknd/afterhours/No response-[phone] or Fellow on call

## 2021-02-07 NOTE — CONSULT NOTE ADULT - ASSESSMENT
INCOMPLETE NOTE. Assessment: 30 yo male with a PMHx of polysubstance abuse. Patient was lethargic on admission with T101F. Primary team initially concerned for opioid withdrawal. Gave several doses of Suboxone without improvement. Patient became increasingly lethargic to the point of only grunting to sternal rub and occasionally saying name. Primary team got CTH which showed mild volume loss. Primary team performed LP. CSF glucose 79, CSF protein 34, total nucleated cell count 2. On exam, patient does not open eyes to verbal or noxious stimuli. Copious drooling. Localizes LUE to pain. Grimaces to pain in other extremities. Strongly resists eye opening. Toes down b/l. Normal tone throughout. Expedited EEG due to concern for seizure. Epilepsy attending Dr. Melchor verbally reported concern for seizure due to prelim EEG findings. Recommended onetime dose of Ativan 2MG IVP. Primary team gave Ativan, exam improved significantly. Concerning for withdrawal from unknown substance, possibly complicated by withdrawal seizure.    Recommendations:  [x] s/p Ativan 2MG IVP x1 with improvement in exam.  [] vEEG. Will follow up official read in AM.  [] Tele monitoring.  [] Start Klonopin 1MG PO BID on 02/08.  [] Would not start any antiepileptic medications at this time.  [] Q4HR neuro checks.  [] Rest of care per primary team.    Case discussed with epilepsy attending Dr. Melchor. Case to be discussed with neurology attending Dr. Stack.

## 2021-02-07 NOTE — BEHAVIORAL HEALTH ASSESSMENT NOTE - NSBHCHARTREVIEWINVESTIGATE_PSY_A_CORE FT
PROBLEM LIST  LABS: Opos/RI/GBS positive     1. Abnormal 1TM screen with normal Verifi: 46 XX. Normal level II US.    Doing well, signs and symptoms of labor and of preeclampsia reviewed again. No concerns. Membranes stripped at her request.  Naa Ashley MD        
QTc 450

## 2021-02-07 NOTE — CONSULT NOTE ADULT - ASSESSMENT
31M with PMH opioid use disorder p/w SOB, admitted for AMS, pending meningitis workup. MICU consulted for concern for airway protection.    #Concern for Airway Compromise  - Pt saturating 95% on RA  - Able to respond appropriately to questions  - ABG reviewed  - The patient was deemed not to be a MICU candidate at this time. Please reconsult if needed

## 2021-02-07 NOTE — BEHAVIORAL HEALTH ASSESSMENT NOTE - NSBHCHARTREVIEWVS_PSY_A_CORE FT
T(C): 37.8 (02-07-21 @ 12:43), Max: 38.5 (02-06-21 @ 19:00)  HR: 75 (02-07-21 @ 12:43) (53 - 75)  BP: 147/76 (02-07-21 @ 12:43) (111/77 - 162/75)  RR: 16 (02-07-21 @ 12:43) (15 - 18)  SpO2: 93% (02-07-21 @ 12:43) (93% - 99%)

## 2021-02-07 NOTE — CONSULT NOTE ADULT - SUBJECTIVE AND OBJECTIVE BOX
Patient is a 31y old  Male who presents with a chief complaint of lethargic / vomiting / fever 101 F (07 Feb 2021 13:39)    From HPI" HPI:  32yo M pmhx opioid use disorder p/w multiple complaints including intermittent SOB, nausea/vomiting and intermittent memory issues, pt. reports last opioid use was "a couple of days ago" per primary team, patient concerned for infection with COVID-19 but recently tested negative. pt. was thought for opioid withdrawal s/p 2mg buprenorphine with little improvement, however after escalating treatment with suboxone patient remains symptomatic and intermittently confused. Now with rectal temp to 101.    d/w mother and sister over phone to obtain history : extensive Hx of poslysubstance abuse , marijuana , cocaine , heroine , he was on subxaone for 2 years and in Dec'2019 has heroine overdose .   family thinks that he still use opioids/ ativan and marijuana , however they didn't find any in his room. he had back surgeries x 2 in past     pt. is lethagic and confused  (06 Feb 2021 17:48)  "    Above verified-agree with above unless noted below.      ID consulted     PAST MEDICAL & SURGICAL HISTORY:  Polysubstance (including opioids) dependence with physiological dependence    L4-L5 disc bulge        Social history:     Unable to obtain     FAMILY HISTORY:  - Family history of medical problems in all first degree relatives reviewed.None of these were found to be related to patients current illness.    REVIEW OF SYSTEMS  pt when seen not responding to anything but noxious stimuli  no verbal response       No Known Allergies    Intolerances        Antimicrobials:    acyclovir IVPB 800 milliGRAM(s) IV Intermittent every 8 hours  cefTRIAXone   IVPB 2000 milliGRAM(s) IV Intermittent every 12 hours  vancomycin  IVPB 1000 milliGRAM(s) IV Intermittent every 12 hours      Prior Antimicrobials:  MEDICATIONS  (STANDING):    acyclovir IVPB   116 mL/Hr IV Intermittent (02-07-21 @ 12:36)   116 mL/Hr IV Intermittent (02-07-21 @ 03:59)    acyclovir IVPB   266 mL/Hr IV Intermittent (02-06-21 @ 19:55)    cefTRIAXone   IVPB   100 mL/Hr IV Intermittent (02-07-21 @ 03:12)    cefTRIAXone   IVPB   100 mL/Hr IV Intermittent (02-06-21 @ 14:54)    vancomycin  IVPB   250 mL/Hr IV Intermittent (02-07-21 @ 05:14)    vancomycin  IVPB.   250 mL/Hr IV Intermittent (02-06-21 @ 18:35)      Other medications reviewed  MEDICATIONS  (STANDING):  acyclovir IVPB 800 milliGRAM(s) IV Intermittent every 8 hours  cefTRIAXone   IVPB 2000 milliGRAM(s) IV Intermittent every 12 hours  dextrose 5% + sodium chloride 0.9%. 1000 milliLiter(s) (100 mL/Hr) IV Continuous <Continuous>  heparin   Injectable 5000 Unit(s) SubCutaneous every 8 hours  pantoprazole  Injectable 40 milliGRAM(s) IV Push every 12 hours  potassium chloride  10 mEq/100 mL IVPB 10 milliEquivalent(s) IV Intermittent every 1 hour  vancomycin  IVPB 1000 milliGRAM(s) IV Intermittent every 12 hours        Vital Signs Last 24 Hrs  T(C): 37.8 (07 Feb 2021 12:43), Max: 38.5 (06 Feb 2021 19:00)  T(F): 100 (07 Feb 2021 12:43), Max: 101.3 (06 Feb 2021 19:00)  HR: 75 (07 Feb 2021 12:43) (53 - 75)  BP: 147/76 (07 Feb 2021 12:43) (111/77 - 162/75)  BP(mean): --  RR: 16 (07 Feb 2021 12:43) (15 - 18)  SpO2: 93% (07 Feb 2021 12:43) (93% - 99%)    PHYSICAL EXAM:mental status as above     Constitutional:Comfortable.Awake and alert  No cachexia     Eyes:PERRL .NO discharge or conjunctival injection    ENMT:no sinus tenderness  no oral exam possible    Neck:Supple,No LN,no JVD      Respiratory:Good air entry bilaterally,CTA    Cardiovascular:S1 S2 wnl, No murmurs,rub or gallops    Gastrointestinal:Soft BS(+) no tenderness no masses ,No rebound or guarding    Genitourinary:No CVA tendereness         Extremities:No cyanosis,clubbing or edema.    Vascular:peripheral pulses felt    Neurological:as above         Musculoskeletal:No joint swelling or LOM              Labs:                            13.8   8.36  )-----------( 260      ( 07 Feb 2021 11:41 )             39.0     WBC Count: 8.36 (02-07-21 @ 11:41)  WBC Count: 8.89 (02-06-21 @ 07:32)      02-07    141  |  105  |  13  ----------------------------<  116<H>  2.9<LL>   |  23  |  0.83    Ca    9.3      07 Feb 2021 11:41    TPro  6.8  /  Alb  4.3  /  TBili  0.6  /  DBili  x   /  AST  23  /  ALT  50<H>  /  AlkPhos  53  02-07            Culture - CSF with Gram Stain (collected 06 Feb 2021 20:40)  Source: .CSF CSF  Gram Stain (06 Feb 2021 21:57):    polymorphonuclear leukocytes seen    No organisms seen    by cytocentrifuge      Herpes Simplex Virus 1/2 PCR, CSF (02.06.21 @ 20:23)   Source HSV 1/2: CSF   HSV 1/2 PCR: NotDetec: HSV1/2 PCR assay is a real-time PCR test that detects and differentiates Protein, CSF (02.06.21 @ 15:56)   Protein, CSF: 34 mg/dL Glucose, CSF (02.06.21 @ 15:56)   Glucose, CSF: 79 mg/dL Total Nucleated Cell Count, CSF: 2 /uL (02.06.21 @ 15:56)     THC, Urine Qualitative (02.06.21 @ 16:41)   THC, Urine Qualitative: Positive: TEST REPEATED. Urinalysis (02.06.21 @ 16:40)   Blood, Urine: Negative   Glucose Qualitative, Urine: Negative   pH Urine: 6.5   Color: Yellow   Urine Appearance: Clear   Bilirubin: Negative   Ketone - Urine: Moderate   Specific Gravity: 1.042   Protein, Urine: 100   Urobilinogen: 3 mg/dL   Nitrite: Negative   Leukocyte Esterase Concentration: Negative   COVID-19 PCR . (02.06.21 @ 08:01)   COVID-19 PCR: NotDetec: You can help in the fight against COVID-19. [facility] may contact Imaging studies(films) were independently reviewed.Findings as detailed in report above   < from: Xray Chest 1 View AP/PA (02.06.21 @ 17:21) >    IMPRESSION:    Clear lungs.        < end of copied text >  < from: CT Head No Cont (02.06.21 @ 07:32) >  IMPRESSION:    Mild volume loss, microvascular disease,no acute hemorrhage or midline shift. If symptoms persist consider follow-up head CT or MR if no contraindications.                < end of copied text >

## 2021-02-07 NOTE — BEHAVIORAL HEALTH ASSESSMENT NOTE - SUMMARY
- presentation inconsistent with substance withdrawal   - presentation inconsistent with a primary psychiatric process   - cannot exclude IV drug use given history   - CT head findings of Mild volume loss, microvascular disease inconsistent with young age; could be secondary effects of polysubstance abuse. Would get MRI   - consider ID consult - presentation inconsistent with substance withdrawal   - presentation inconsistent with a primary psychiatric process   - cannot exclude IV drug use given history   - ABG results? repeat?  - CT head findings of Mild volume loss, microvascular disease inconsistent with young age; could be secondary effects of polysubstance abuse. Would get MRI   - consider ID consult - presentation inconsistent with substance withdrawal   - presentation inconsistent with a primary psychiatric process  - mental status with somnolence, sedation - Pt is not on any sedating medication at this time that would explain this presentation   - cannot exclude IV drug use given history   - ABG results? repeat?  - CT head findings of Mild volume loss, microvascular disease inconsistent with young age; could be secondary effects of polysubstance abuse. Would get MRI   - consider ID consult

## 2021-02-07 NOTE — BEHAVIORAL HEALTH ASSESSMENT NOTE - HPI (INCLUDE ILLNESS QUALITY, SEVERITY, DURATION, TIMING, CONTEXT, MODIFYING FACTORS, ASSOCIATED SIGNS AND SYMPTOMS)
30 yo male, single, noncaregiver, domiciled with family, hx of Polysubstance Abuse (heroin - snorts w/ OD in ; THC, cocaine), hx of Suboxone agonist tx x 2 yrs, , otherwise unremarkable past psychiatric history who presented from home for change in cognition /behavior (ie. repeating questions, acting abnormally, short term memory impairment), fever, nausea and vomiting and appeared drowsy and diaphoretic in ED. Pt's mother told ED staff that Patient was confused, told her some people called him multiple times and told him things that are not true (ex:  "my sister-in-law called him and told him that his dad " but he did not, with few other similar incidents") with fluctuating pattern -  minutes elapsing between confusion and then back to baseline. UTOX positive for THC, opiate. s/p spinal tap; Meningitis on differential so he was started on acyclovir / IV vanco/ IV rocephine. Psych consult called for continued AMS    ISTOP Reference #: [id number] (all 50 States ran) no record of Patient; CVM no record of Patient 30 yo male, single, noncaregiver, domiciled with family, hx of Polysubstance Abuse (heroin - snorts w/ OD in ; THC, cocaine), hx of Suboxone agonist tx x 2 yrs, , otherwise unremarkable past psychiatric history who presented from home for change in cognition /behavior (ie. repeating questions, acting abnormally, short term memory impairment), fever, nausea and vomiting and appeared drowsy and diaphoretic in ED. Pt's mother told ED staff that Patient was confused, told her some people called him multiple times and told him things that are not true (ex:  "my sister-in-law called him and told him that his dad " but he did not, with few other similar incidents") with fluctuating pattern -  minutes elapsing between confusion and then back to baseline. UTOX positive for THC, opiate. s/p spinal tap; Meningitis on differential so he was started on acyclovir / IV vanco/ IV rocephine. Psych consult called for continued AMS    EXAM: limited. Patient lying in bed, in deep sleep, drooling, and does not respond to verbal command. Patient has to be shaken for him to stir. He does not open his eyes, movements are slow, and he does not verbally engage. Unable to nod/shake head to questions as well. meds reviewed - he did not get anything sedating any time recently     ISTOP Reference #: [id number] (all 50 States ran) no record of Patient; CVM no record of Patient

## 2021-02-07 NOTE — CONSULT NOTE ADULT - SUBJECTIVE AND OBJECTIVE BOX
Patient is a 31y old  Male who presents with a chief complaint of lethargic / vomiting / fever 101 F (2021 08:24)      HPI:  32yo M pmhx opioid use disorder p/w multiple complaints including intermittent SOB, nausea/vomiting and intermittent memory issues, pt. reports last opioid use was "a couple of days ago" per primary team, patient concerned for infection with COVID-19 but recently tested negative. pt. was thought for opioid withdrawal s/p 2mg buprenorphine with little improvement, however after escalating treatment with suboxone patient remains symptomatic and intermittently confused. Now with rectal temp to 101.    d/w mother and sister over phone to obtain history : extensive Hx of poslysubstance abuse , marijuana , cocaine , heroine , he was on subxaone for 2 years and in Dec'2019 has heroine overdose .   family thinks that he still use opioids/ ativan and marijuana , however they didn't find any in his room. he had back surgeries x 2 in past     pt. is lethagic and confused  (2021 17:48)      PAST MEDICAL & SURGICAL HISTORY:  Polysubstance (including opioids) dependence with physiological dependence    L4-L5 disc bulge              ECHO  FINDINGS:      MEDICATIONS  (STANDING):  acyclovir IVPB 800 milliGRAM(s) IV Intermittent every 8 hours  cefTRIAXone   IVPB 2000 milliGRAM(s) IV Intermittent every 12 hours  dextrose 5% + sodium chloride 0.9%. 1000 milliLiter(s) (100 mL/Hr) IV Continuous <Continuous>  heparin   Injectable 5000 Unit(s) SubCutaneous every 8 hours  pantoprazole  Injectable 40 milliGRAM(s) IV Push every 12 hours  potassium chloride  10 mEq/100 mL IVPB 10 milliEquivalent(s) IV Intermittent every 1 hour  vancomycin  IVPB 1000 milliGRAM(s) IV Intermittent every 12 hours    MEDICATIONS  (PRN):  ondansetron Injectable 4 milliGRAM(s) IV Push every 4 hours PRN Nausea and/or Vomiting      FAMILY HISTORY:          REVIEW OF SYSTEMS:    CONSTITUTIONAL: No weakness, fevers or chills  EYES/ENT: No visual changes;  No vertigo or throat pain   NECK: No pain or stiffness  RESPIRATORY: No cough, wheezing, hemoptysis; No shortness of breath  CARDIOVASCULAR: No chest pain or palpitations  GASTROINTESTINAL: No abdominal or epigastric pain. No nausea, vomiting, or hematemesis; No diarrhea or constipation. No melena or hematochezia.  GENITOURINARY: No dysuria, frequency or hematuria  NEUROLOGICAL: No numbness or weakness  All other review of systems is negative unless indicated above.    SOCIAL HISTORY:    CIGARETTES:    ALCOHOL:  Vital Signs Last 24 Hrs  T(C): 37.8 (2021 12:43), Max: 38.5 (2021 19:00)  T(F): 100 (2021 12:43), Max: 101.3 (2021 19:00)  HR: 75 (2021 12:43) (53 - 75)  BP: 147/76 (2021 12:43) (111/77 - 162/75)  BP(mean): --  RR: 16 (2021 12:43) (15 - 18)  SpO2: 93% (2021 12:43) (93% - 99%)    PHYSICAL EXAM:  Appearance: Normal	  Psychiatry: A & O x 3, Mood & affect appropriate  HEENT:   Normal oral mucosa, PERRL, EOMI	  Lymphatic: No lymphadenopathy  Cardiovascular: Normal S1 S2,RRR, No JVD, No murmurs  Respiratory: Lungs clear to auscultation	  Gastrointestinal:  Soft, Non-tender, + BS	  Skin: No rashes, No ecchymoses, No cyanosis	  Neurologic: Non-focal  Extremities: Normal range of motion, No clubbing, cyanosis or edema  Vascular: Peripheral pulses palpable 2+ bilaterally      ECG:    I&O's Detail    2021 07:01  -  2021 07:00  --------------------------------------------------------  IN:    dextrose 5% + sodium chloride 0.9%: 300 mL    IV PiggyBack: 65 mL    IV PiggyBack: 50 mL    IV PiggyBack: 250 mL  Total IN: 665 mL    OUT:  Total OUT: 0 mL    Total NET: 665 mL      2021 07:01  -  2021 13:39  --------------------------------------------------------  IN:  Total IN: 0 mL    OUT:    Indwelling Catheter - Urethral (mL): 650 mL  Total OUT: 650 mL    Total NET: -650 mL          LABS:                        13.8   8.36  )-----------( 260      ( 2021 11:41 )             39.0     02-07    141  |  105  |  13  ----------------------------<  116<H>  2.9<LL>   |  23  |  0.83    Ca    9.3      2021 11:41    TPro  6.8  /  Alb  4.3  /  TBili  0.6  /  DBili  x   /  AST  23  /  ALT  50<H>  /  AlkPhos  53  -          Urinalysis Basic - ( 2021 16:40 )    Color: Yellow / Appearance: Clear / S.042 / pH: x  Gluc: x / Ketone: Moderate  / Bili: Negative / Urobili: 3 mg/dL   Blood: x / Protein: 100 / Nitrite: Negative   Leuk Esterase: Negative / RBC: x / WBC x   Sq Epi: x / Non Sq Epi: x / Bacteria: x      I&O's Summary    2021 07:01  -  2021 07:00  --------------------------------------------------------  IN: 665 mL / OUT: 0 mL / NET: 665 mL    2021 07:01  -  2021 13:39  --------------------------------------------------------  IN: 0 mL / OUT: 650 mL / NET: -650 mL      BNP  RADIOLOGY & ADDITIONAL STUDIES:

## 2021-02-07 NOTE — CHART NOTE - NSCHARTNOTEFT_GEN_A_CORE
Pt seen at bedside this morning, lethargic, drooling, and feeling warm. Unable to obtain ROS due to mental status.      Vital Signs Last 24 Hrs  T(C): 37.8 (07 Feb 2021 12:43), Max: 38.5 (06 Feb 2021 19:00)  T(F): 100 (07 Feb 2021 12:43), Max: 101.3 (06 Feb 2021 19:00)  HR: 75 (07 Feb 2021 12:43) (53 - 75)  BP: 147/76 (07 Feb 2021 12:43) (111/77 - 162/75)  BP(mean): --  RR: 16 (07 Feb 2021 12:43) (15 - 18)  SpO2: 93% (07 Feb 2021 12:43) (93% - 99%)    Physical Exam:  General: WN/WD NAD  Neurology: A&Ox3, nonfocal, MCCALL x 4  Head:  Normocephalic, atraumatic  Respiratory: CTA B/L  CV: RRR, S1S2, no murmur  Abdominal: Soft, NT, ND no palpable mass  MSK: No edema, + peripheral pulses, FROM all 4 extremity  Labs:                          13.8   8.36  )-----------( 260      ( 07 Feb 2021 11:41 )             39.0     02-07    141  |  105  |  13  ----------------------------<  116<H>  2.9<LL>   |  23  |  0.83    Ca    9.3      07 Feb 2021 11:41    TPro  6.8  /  Alb  4.3  /  TBili  0.6  /  DBili  x   /  AST  23  /  ALT  50<H>  /  AlkPhos  53  02-07        ABG - ( 07 Feb 2021 12:11 )  pH, Arterial: 7.50  pH, Blood: x     /  pCO2: 33    /  pO2: 45    / HCO3: 25    / Base Excess: 2.9   /  SaO2: 83            Assessment & Plan:  32yo M pmhx opioid use disorder p/w multiple complaints including intermittent SOB, nausea/vomiting and intermittent memory issues, pt. reports last opioid use was "a couple of days ago" per primary team,...  >  >  >    Dionna Aguayo PA-C (Medicine PA)  # Pt seen at bedside this morning, lethargic, drooling, and feeling warm. Unable to obtain ROS due to mental status. Answers to his name and says his name out loud when asked, but can not elicit any more information      Vital Signs Last 24 Hrs  T(C): 37.8 (2021 12:43), Max: 38.5 (2021 19:00)  T(F): 100 (2021 12:43), Max: 101.3 (2021 19:00)  HR: 75 (2021 12:43) (53 - 75)  BP: 147/76 (2021 12:43) (111/77 - 162/75)  BP(mean): --  RR: 16 (2021 12:43) (15 - 18)  SpO2: 93% (2021 12:43) (93% - 99%)    Physical Exam:  General: drooling  Neurology: A&O x1  Head:  Normocephalic, atraumatic  CV: RRR, S1S2, no murmur  Neuro: unable to perform full neuro exam, due to pt's mental status. PERRL    Labs:                          13.8   8.36  )-----------( 260      ( 2021 11:41 )             39.0     02-07    141  |  105  |  13  ----------------------------<  116<H>  2.9<LL>   |  23  |  0.83    Ca    9.3      2021 11:41    TPro  6.8  /  Alb  4.3  /  TBili  0.6  /  DBili  x   /  AST  23  /  ALT  50<H>  /  AlkPhos  53  02-07        ABG - ( 2021 12:11 )  pH, Arterial: 7.50  pH, Blood: x     /  pCO2: 33    /  pO2: 45    / HCO3: 25    / Base Excess: 2.9   /  SaO2: 83            Assessment & Plan:  30yo M pmhx opioid use disorder p/w multiple complaints including intermittent SOB, nausea/vomiting and intermittent memory issues, pt. reports last opioid use was "a couple of days ago" per primary team, now with AMS, drooling, and tremors,  > check vital signs  > D/w Dr. Powell, who recommends psych, ID, and neuro consults  > Called ICU consult, awaiting recommendations    Dionna Aguayo PA-C (Medicine PA)  #[id number]    **Addendum 18:48  > Spoke with neurology. Concern for withdrawal vs neuroleptic malignant syndrome.  > Dr. Melchor from EEG recommended Ativan 2 mg IV push. Pt's mental status improved, now able to say his name, , and knows he is at the hospital. States he "does not remember" how he got here  > Dr. Melchor recommends starting Clonazepam at 9pm, then continuing TID through tomorrow.  > Put patient on low risk CIWA, will transfer to tele floor.  > If pt is tachychardic, hypotensive, or hypertensive, transfer to MICU, as per Dr. Melchor  > D/w Dr. Powell, who is in agreement

## 2021-02-07 NOTE — CONSULT NOTE ADULT - SUBJECTIVE AND OBJECTIVE BOX
CARDIOLOGY CONSULT - Dr. Nolen         HPI:  32yo M pmhx opioid use disorder p/w multiple complaints including intermittent SOB, nausea/vomiting and intermittent memory issues, pt. reports last opioid use was "a couple of days ago" per primary team, patient concerned for infection with COVID-19 but recently tested negative.  Patient denies any cardiac testing.  Currently reports improvement w SOB.  Patient denies any chest pain, palpitations, cough, syncope, edema, exertional symptoms, nausea, abdominal pain,chills, or rash.  Denies any history of CAD, MI, valve disease, cardiomyopathy, or congenital heart disease.      PAST MEDICAL & SURGICAL HISTORY:  Polysubstance (including opioids) dependence with physiological dependence    L4-L5 disc bulge            PREVIOUS DIAGNOSTIC TESTING:    [ ] Echocardiogram:  [ ]  Catheterization:  [ ] Stress Test:  	    MEDICATIONS:  Home Medications:      MEDICATIONS  (STANDING):  acyclovir IVPB 800 milliGRAM(s) IV Intermittent every 8 hours  cefTRIAXone   IVPB 2000 milliGRAM(s) IV Intermittent every 12 hours  dextrose 5% + sodium chloride 0.9%. 1000 milliLiter(s) (100 mL/Hr) IV Continuous <Continuous>  heparin   Injectable 5000 Unit(s) SubCutaneous every 8 hours  pantoprazole  Injectable 40 milliGRAM(s) IV Push every 12 hours  vancomycin  IVPB 1000 milliGRAM(s) IV Intermittent every 12 hours      FAMILY HISTORY:      SOCIAL HISTORY:    [ ] Non-smoker  [x ] Smoker  [ ] Alcohol    Allergies    No Known Allergies    Intolerances    	    REVIEW OF SYSTEMS:  CONSTITUTIONAL: No fever, weight loss, or fatigue  EYES: No eye pain, visual disturbances, or discharge  ENMT:  No difficulty hearing, tinnitus, vertigo; No sinus or throat pain  NECK: No pain or stiffness  RESPIRATORY: No cough, wheezing, chills or hemoptysis; + Shortness of Breath  CARDIOVASCULAR: No chest pain, palpitations, passing out, dizziness, or leg swelling  GASTROINTESTINAL: No abdominal or epigastric pain. No nausea, vomiting, or hematemesis; No diarrhea or constipation. No melena or hematochezia.  GENITOURINARY: No dysuria, frequency, hematuria, or incontinence  NEUROLOGICAL: No headaches, memory loss, loss of strength, numbness, or tremors  SKIN: No itching, burning, rashes, or lesions   	    [x ] All others negative	see hpi   [ ] Unable to obtain    PHYSICAL EXAM:  T(C): 38 (02-07-21 @ 05:30), Max: 38.5 (02-06-21 @ 19:00)  HR: 60 (02-07-21 @ 05:30) (53 - 86)  BP: 148/95 (02-07-21 @ 05:30) (132/72 - 162/75)  RR: 18 (02-07-21 @ 05:30) (15 - 18)  SpO2: 94% (02-07-21 @ 05:30) (94% - 100%)  Wt(kg): --  I&O's Summary    06 Feb 2021 07:01  -  07 Feb 2021 07:00  --------------------------------------------------------  IN: 665 mL / OUT: 0 mL / NET: 665 mL        Appearance: Normal	  Psychiatry: A & O x 3, Mood & affect appropriate  HEENT:   Normal oral mucosa, PERRL, EOMI	  Lymphatic: No lymphadenopathy  Cardiovascular: Normal S1 S2,RRR, No JVD, No murmurs  Respiratory: Lungs clear to auscultation	  Gastrointestinal:  Soft, Non-tender, + BS	  Skin: No rashes, No ecchymoses, No cyanosis	  Neurologic: Non-focal  Extremities: Normal range of motion, No clubbing, cyanosis or edema  Vascular: Peripheral pulses palpable 2+ bilaterally    TELEMETRY: off tele  	    ECG:  	NSR without ischemic changes   RADIOLOGY:  Xray Chest 1 View AP/PA (02.06.21 @ 17:21)     FINDINGS:    The lungs are clear. No pleural effusion or pneumothorax.  Normal cardiomediastinal silhouette.  No acute bony pathology.  The visualized portions of the abdomen are unremarkable.    IMPRESSION:    Clear lungs.      CT Head No Cont (02.06.21 @ 07:32)   FINDINGS:  Mild prominence of the sulci and ventricles slightly greater than expected for age. There are a few hemispheric white matter areas of low attenuation which are nonspecific but likely related to sequelae of microvascular disease. There is no intraparenchymal hematoma, mass effect or midline shift. No abnormal extra-axial fluid collections are present. The basal cisterns are patent.    The calvarium is intact. The visualized intraorbital compartments, paranasal sinuses and tympanomastoid cavities appear free of acute disease.    IMPRESSION:    Mild volume loss, microvascular disease,no acute hemorrhage or midline shift. If symptoms persist consider follow-up head CT or MR if no contraindications.    OTHER: 	  	  LABS:	 	    CARDIAC MARKERS:                   15.9   8.89  )-----------( 294      ( 06 Feb 2021 07:32 )             45.1     02-06    142  |  102  |  20  ----------------------------<  116<H>  3.7   |  21<L>  |  0.87    Ca    10.4      06 Feb 2021 07:32    TPro  8.5<H>  /  Alb  5.2<H>  /  TBili  0.8  /  DBili  x   /  AST  37  /  ALT  85<H>  /  AlkPhos  64  02-06      proBNP:   Lipid Profile:   HgA1c:   TSH: Thyroid Stimulating Hormone, Serum: 1.05 uIU/mL (02-06 @ 12:36)

## 2021-02-07 NOTE — CONSULT NOTE ADULT - ATTENDING COMMENTS
I have examined pt and agree with above exam and plan.  31 you male with AMS. Work up in progress. Asked to evaluate because pt was drooling.   On my exam pt is easily arousable and follows simple commands. He can protect his airway.  ? psychiatric disorder.    Pt can protect airway at this time without difficulty.  Pt is not a MICU candidate at this time.
Pt seen and examined, agree with the above assessment and plan by IKE Eugene.  30yo M pmhx opioid use disorder p/w multiple complaints including intermittent SOB, nausea/vomiting and intermittent memory issues    1. Intermittent SOB  -no chf on exam   -CXR with clear lungs  -EKG without ischemic changes  -pos tox screen noted   -check echo to rule out LV or valvular dysfunction     2. AMS  -etiology unclear  -CT head neg  -s/p LP   -low grade fevers noted  -w/u per med/ID    dvt ppx
30 y/o man with hx of substance abuse, p/w AMS, lethargy and hallucinations. Pt today seen by team, is awake and conversational. Pt admits to snorting heroin prior to coming to the hospital. This is his drug of choice, denies any other drug use, although urine tox was also positive for THC.   Pt complains mainly of recent difficulty with short term memory, and symptoms of WD such as pain and nausea. On initial assessment there was concern for possible withdrawal seizure activity for which he was started on Klonopin 1mg BID.   On exam he has myoclonus and whole body tremor, he is awake, fully oriented, speech normal fluent, follows commands, strength intact throughout, CN all intact, reflexes 2+ throughout.     2/8- EEG SUMMARY/CLASSIFICATION  Abnormal EEG in the awake state   - Mild generalized slowing.  _____________________________________________________________  EEG IMPRESSION/CLINICAL CORRELATE  Abnormal EEG study.  1. Mild nonspecific diffuse or multifocal cerebral dysfunction.   2. No epileptiform pattern or seizure seen.  * Limited evaluation.  Very prominent myogenic and movement artifacts were noted. No video recorded.     Pt with hx of heroin abuse p/w encephalopathy.  No seizure activity evident on EEG. Would taper down Klonopin to 0.5mg BID.   Would get MRI brain to evaluate for heroin induced leukoencephalopathy.   Care as per Primary team and Navya.

## 2021-02-07 NOTE — CONSULT NOTE ADULT - ASSESSMENT
a/p  30yo M pmhx opioid use disorder p/w multiple complaints including intermittent SOB, nausea/vomiting and intermittent memory issues    1. Intermittent SOB  -no chf on exam   -CXR with clear lungs  -EKG without ischemic changes  -pos tox screen noted   -check echo to rule out LV or valvular dysfunction     2. AMS  -etiology unclear  -CT head neg  -s/p LP   -low grade fevers noted  -w/u per med/ID    dvt ppx

## 2021-02-07 NOTE — CHART NOTE - NSCHARTNOTEFT_GEN_A_CORE
MEDICINE PA    Notified by RN patient with temperature 99.9 and feels warm. Rectal temp 101.1.   Seen and examined patient at bedside. Patient is here 2/2 encephalopathy w/ AMS likely 2/2 opioid withdrawal on CIWA .   Denies HA, CP, SOB, cough, N/V, or abd pain.     VITAL SIGNS:  T(C): 38.4 (02-07-21 @ 22:31), Max: 38.4 (02-07-21 @ 22:31)  HR: 57 (02-07-21 @ 22:31) (53 - 85)  BP: 147/75 (02-07-21 @ 22:31) (111/77 - 151/84)  RR: 18 (02-07-21 @ 22:31) (16 - 20)  SpO2: 98% (02-07-21 @ 22:31) (93% - 98%)        LABORATORY:                          13.8   8.36  )-----------( 260      ( 07 Feb 2021 11:41 )             39.0       02-07    141  |  105  |  10  ----------------------------<  114<H>  3.2<L>   |  25  |  0.83    Ca    8.9      07 Feb 2021 18:36  Phos  2.1     02-07  Mg     2.3     02-07    TPro  7.3  /  Alb  4.2  /  TBili  0.6  /  DBili  0.1  /  AST  25  /  ALT  50<H>  /  AlkPhos  53  02-07          MICROBIOLOGY:           RADIOLOGY:          PHYSICAL EXAM:    Constitutional: AOx3. NAD.    Respiratory: clear lungs bilaterally. No wheezing, rhonchi, or crackles.    Cardiovascular: S1 S2. No murmurs.    Gastrointestinal: BS X4 active. soft. nontender.    Extremities/Vascular: +2 pulses bilaterally. No BLE edema.      ASSESSMENT/PLAN:   HPI:  32yo M pmhx opioid use disorder p/w multiple complaints including intermittent SOB, nausea/vomiting and intermittent memory issues, pt. reports last opioid use was "a couple of days ago" per primary team, patient concerned for infection with COVID-19 but recently tested negative. pt. was thought for opioid withdrawal s/p 2mg buprenorphine with little improvement, however after escalating treatment with suboxone patient remains symptomatic and intermittently confused. Now with rectal temp to 101.    d/w mother and sister over phone to obtain history : extensive Hx of poslysubstance abuse , marijuana , cocaine , heroine , he was on subxaone for 2 years and in Dec'2019 has heroine overdose .   family thinks that he still use opioids/ ativan and marijuana , however they didn't find any in his room. he had back surgeries x 2 in past     pt. is lethagic and confused  (06 Feb 2021 17:48)        1) Fever  -tylenol and cooling measures prn for pyrexia  -BC x2, UA/UC  -CXR  -c/w   -F/U primary team in AM MEDICINE PA    Notified by RN patient with temperature 99.9 and feels warm. Rectal temp 101.1.   Seen and examined patient at bedside. Patient is here 2/2 encephalopathy w/ AMS likely 2/2 opioid withdrawal on CIWA .   Denies HA, CP, SOB, cough, N/V, or abd pain. Pt is warm to touch.     PHYSICAL EXAM:  Constitutional: AOx2. NAD.  Respiratory: clear lungs bilaterally.   Cardiovascular: S1 S2.   Gastrointestinal: BS X4 active. soft. nontender.  Extremities/Vascular: +2 pulses bilaterally.         VITAL SIGNS:  T(C): 38.4 (02-07-21 @ 22:31), Max: 38.4 (02-07-21 @ 22:31)  HR: 57 (02-07-21 @ 22:31) (53 - 85)  BP: 147/75 (02-07-21 @ 22:31) (111/77 - 151/84)  RR: 18 (02-07-21 @ 22:31) (16 - 20)  SpO2: 98% (02-07-21 @ 22:31) (93% - 98%)      LABORATORY:                          13.8   8.36  )-----------( 260      ( 07 Feb 2021 11:41 )             39.0       02-07    141  |  105  |  10  ----------------------------<  114<H>  3.2<L>   |  25  |  0.83    Ca    8.9      07 Feb 2021 18:36  Phos  2.1     02-07  Mg     2.3     02-07    TPro  7.3  /  Alb  4.2  /  TBili  0.6  /  DBili  0.1  /  AST  25  /  ALT  50<H>  /  AlkPhos  53  02-07        MICROBIOLOGY:     Culture - Urine (02.06.21 @ 20:52)    Specimen Source: .Urine Catheterized    Culture Results:   No growth      RADIOLOGY:  < from: Xray Chest 1 View AP/PA (02.06.21 @ 17:21) >  FINDINGS:  The lungs are clear. No pleural effusion or pneumothorax.  Normal cardiomediastinal silhouette.  No acute bony pathology.  The visualized portions of the abdomen are unremarkable.    IMPRESSION:  Clear lungs.  < end of copied text >        ASSESSMENT/PLAN:   HPI:  30yo M pmhx opioid use disorder p/w multiple complaints including intermittent SOB, nausea/vomiting and intermittent memory issues, pt. reports last opioid use was "a couple of days ago" per primary team, patient concerned for infection with COVID-19 but recently tested negative. pt. was thought for opioid withdrawal s/p 2mg buprenorphine with little improvement, however after escalating treatment with suboxone patient remains symptomatic and intermittently confused. Now with rectal temp to 101.  d/w mother and sister over phone to obtain history : extensive Hx of poslysubstance abuse , marijuana , cocaine , heroine , he was on subxaone for 2 years and in Dec'2019 has heroine overdose .   family thinks that he still use opioids/ ativan and marijuana , however they didn't find any in his room. he had back surgeries x 2 in past   pt. is lethagic and confused  (06 Feb 2021 17:48)        1) Fever 101.1 w/ the rest VS stable likely 2/2 withdrawal   -Tylenol 650po x1 and cooling measures prn for pyrexia  -BC x2 STAT ordered   - CT chest Abd Pelvis w/ IV contrast as per ID   -c/w vanco and ceftrioxone as per ID   -F/U primary team in AM      Karl Rodríguez  Dept of Medicine   #[id number] MEDICINE [facility]    Notified by RN patient with temperature 99.9 and feels warm. Rectal temp 101.1.   Seen and examined patient at bedside. Patient is here 2/2 encephalopathy w/ AMS likely 2/2 opioid withdrawal on CIWA .   Denies HA, CP, SOB, cough, N/V, or abd pain. Pt is warm to touch.     PHYSICAL EXAM:  Constitutional: AOx2. Observed pt and he was talking out loud with his eyes closed   Respiratory: clear lungs bilaterally.   Cardiovascular: S1 S2.   Gastrointestinal: BS X4 active. soft. nontender.  Extremities/Vascular: +2 pulses bilaterally.         VITAL SIGNS:  T(C): 38.4 (21 @ 22:31), Max: 38.4 (21 @ 22:31)  HR: 57 (21 @ :31) (53 - 85)  BP: 147/75 (21 @ 22:31) (111/77 - 151/84)  RR: 18 (21 @ 22:31) (16 - 20)  SpO2: 98% (21 @ 22:31) (93% - 98%)      LABORATORY:                          13.8   8.36  )-----------( 260      ( 2021 11:41 )             39.0       02-    141  |  105  |  10  ----------------------------<  114<H>  3.2<L>   |  25  |  0.83    Ca    8.9      2021 18:36  Phos  2.1     -  Mg     2.3     -    TPro  7.3  /  Alb  4.2  /  TBili  0.6  /  DBili  0.1  /  AST  25  /  ALT  50<H>  /  AlkPhos  53  02-07    Urinalysis Basic - ( 2021 16:40 )    Color: Yellow / Appearance: Clear / S.042 / pH: x  Gluc: x / Ketone: Moderate  / Bili: Negative / Urobili: 3 mg/dL   Blood: x / Protein: 100 / Nitrite: Negative   Leuk Esterase: Negative / RBC: x / WBC x   Sq Epi: x / Non Sq Epi: x / Bacteria: x        MICROBIOLOGY:     Culture - Urine (21 @ 20:52)    Specimen Source: .Urine Catheterized    Culture Results:   No growth      RADIOLOGY:  < from: Xray Chest 1 View AP/PA (21 @ 17:21) >  FINDINGS:  The lungs are clear. No pleural effusion or pneumothorax.  Normal cardiomediastinal silhouette.  No acute bony pathology.  The visualized portions of the abdomen are unremarkable.    IMPRESSION:  Clear lungs.  < end of copied text >        ASSESSMENT/PLAN:   HPI:  32yo M pmhx opioid use disorder p/w multiple complaints including intermittent SOB, nausea/vomiting and intermittent memory issues, pt. reports last opioid use was "a couple of days ago" per primary team, patient concerned for infection with COVID-19 but recently tested negative. pt. was thought for opioid withdrawal s/p 2mg buprenorphine with little improvement, however after escalating treatment with suboxone patient remains symptomatic and intermittently confused. Now with rectal temp to 101.  d/w mother and sister over phone to obtain history : extensive Hx of poslysubstance abuse , marijuana , cocaine , heroine , he was on subxaone for 2 years and in Dec'2019 has heroine overdose .   family thinks that he still use opioids/ ativan and marijuana , however they didn't find any in his room. he had back surgeries x 2 in past   pt. is lethagic and confused  (2021 17:48)        1) Fever 101.1 w/ the rest VS stable likely 2/2 withdrawal   -Tylenol 650po x1 and cooling measures prn for pyrexia  -BC x2 STAT ordered   -CT chest Abd Pelvis as per ID   -c/w vanco and ceftrioxone as per ID   -F/U primary team in AM  - Discussed with Dr. Powell and agreed with the above plan, recommended to have CT done with contrast.       Karl Rodríguez  Dept of Medicine   #[id number]

## 2021-02-07 NOTE — BEHAVIORAL HEALTH ASSESSMENT NOTE - NSBHCHARTREVIEWIMAGING_PSY_A_CORE FT
CT Head No Cont (02.06.21) Mild volume loss, microvascular disease, no acute hemorrhage or midline shift. If symptoms persist consider follow-up head CT or MR if no contraindications.

## 2021-02-07 NOTE — CONSULT NOTE ADULT - SUBJECTIVE AND OBJECTIVE BOX
PAYAL BULLOCK  Male  MRN-[id number]    HPI:  32yo M pmhx opioid use disorder p/w multiple complaints including intermittent SOB, nausea/vomiting and intermittent memory issues, pt. reports last opioid use was "a couple of days ago" per primary team, patient concerned for infection with COVID-19 but recently tested negative. pt. was thought for opioid withdrawal s/p 2mg buprenorphine with little improvement, however after escalating treatment with suboxone patient remains symptomatic and intermittently confused. Now with rectal temp to 101.    d/w mother and sister over phone to obtain history : extensive Hx of poslysubstance abuse , marijuana , cocaine , heroine , he was on subxaone for 2 years and in Dec'2019 has heroine overdose .   family thinks that he still use opioids/ ativan and marijuana , however they didn't find any in his room. he had back surgeries x 2 in past     pt. is lethagic and confused  (06 Feb 2021 17:48)      NIHSS:  MRS:     ROS: All negative except as mentioned in HPI.    PAST MEDICAL & SURGICAL HISTORY:  Polysubstance (including opioids) dependence with physiological dependence    L4-L5 disc bulge      MEDICATIONS  (STANDING):  clonazePAM  Tablet 1 milliGRAM(s) Oral once  dextrose 5% + sodium chloride 0.9%. 1000 milliLiter(s) (100 mL/Hr) IV Continuous <Continuous>  heparin   Injectable 5000 Unit(s) SubCutaneous every 8 hours  pantoprazole  Injectable 40 milliGRAM(s) IV Push every 12 hours  vancomycin  IVPB 1000 milliGRAM(s) IV Intermittent every 12 hours    MEDICATIONS  (PRN):  ondansetron Injectable 4 milliGRAM(s) IV Push every 4 hours PRN Nausea and/or Vomiting    Allergies    No Known Allergies    Intolerances        VITAL SIGNS:  T(F): 100  HR: 70  BP: 136/83  RR: 18  SpO2: 94%    Exam:   MS: Oriented x3. Fluent. Follows crossed commands.   CN: VFF. EOMI. V1-V3 intact. Face symmetric. T/u midline.   Motor: Full strength throughout.   Sensory: Intact to LT throughout   Reflexes: 2+ throughout. Babinski absent bilaterally.   Coordination: No dysmetria on FNF or ataxia on HTS bilaterally   Gait: Deferred    LABS:                          13.8   8.36  )-----------( 260      ( 07 Feb 2021 11:41 )             39.0     02-07    141  |  105  |  10  ----------------------------<  114<H>  3.2<L>   |  25  |  0.83    Ca    8.9      07 Feb 2021 18:36  Phos  2.1     02-07  Mg     2.3     02-07    TPro  7.3  /  Alb  4.2  /  TBili  0.6  /  DBili  0.1  /  AST  25  /  ALT  50<H>  /  AlkPhos  53  02-07        RADIOLOGY:             PAYAL BULLOCK  Male  MRN-[id number]    Subjective: Neurology consulted for persistent AMS. Patient with extensive drug use history. Patient was lethargic on admission with T101F. Primary team initially concerned for opioid withdrawal. Gave several doses of Suboxone without improvement. Patient became increasingly lethargic to the point of only grunting to sternal rub and occasionally saying name. Primary team got CTH which showed mild volume loss. Primary team performed LP. CSF glucose 79, CSF protein 34, total nucleated cell count 2. On exam, patient does not open eyes to verbal or noxious stimuli. Copious drooling. Localizes LUE to pain. Grimaces to pain in other extremities. Strongly resists eye opening. Toes down b/l. Normal tone throughout. Expedited EEG due to concern for seizure. Epilepsy attending Dr. Melchor verbally reported concern for seizure due to prelim EEG findings. Recommended onetime dose of Ativan 2MG IVP. Primary team gave Ativan, exam improved significantly. Patient became more rousable and was able to state name, follow simple commands. Patient transferred to  to be on tele. Will start on Klonopin 1MG PO BID on recommendation of Dr. eMlchor.      Of note, patient has history of febrile seizure at age 3 per family.    HPI: (Per primary team. Patient unable to provide history.)  30yo M pmhx opioid use disorder p/w multiple complaints including intermittent SOB, nausea/vomiting and intermittent memory issues, pt. reports last opioid use was "a couple of days ago" per primary team, patient concerned for infection with COVID-19 but recently tested negative. pt. was thought for opioid withdrawal s/p 2mg buprenorphine with little improvement, however after escalating treatment with suboxone patient remains symptomatic and intermittently confused. Now with rectal temp to 101.    d/w mother and sister over phone to obtain history : extensive Hx of poslysubstance abuse , marijuana , cocaine , heroine , he was on subxaone for 2 years and in Dec'2019 has heroine overdose .   family thinks that he still use opioids/ ativan and marijuana , however they didn't find any in his room. he had back surgeries x 2 in past     pt. is lethagic and confused  (06 Feb 2021 17:48)    ROS: Unable to obtain, patient not rousable to verbal or noxious stimuli.    PAST MEDICAL & SURGICAL HISTORY:  Polysubstance (including opioids) dependence with physiological dependence    L4-L5 disc bulge    MEDICATIONS  (STANDING):  clonazePAM  Tablet 1 milliGRAM(s) Oral once  dextrose 5% + sodium chloride 0.9%. 1000 milliLiter(s) (100 mL/Hr) IV Continuous <Continuous>  heparin   Injectable 5000 Unit(s) SubCutaneous every 8 hours  pantoprazole  Injectable 40 milliGRAM(s) IV Push every 12 hours  vancomycin  IVPB 1000 milliGRAM(s) IV Intermittent every 12 hours    MEDICATIONS  (PRN):  ondansetron Injectable 4 milliGRAM(s) IV Push every 4 hours PRN Nausea and/or Vomiting    Allergies    No Known Allergies    Vital Signs Last 24 Hrs  T(C): 37.7 (08 Feb 2021 00:21), Max: 38.4 (07 Feb 2021 22:31)  T(F): 99.9 (08 Feb 2021 00:21), Max: 101.1 (07 Feb 2021 22:31)  HR: 59 (08 Feb 2021 00:21) (57 - 85)  BP: 131/56 (08 Feb 2021 00:21) (111/77 - 151/84)  RR: 18 (08 Feb 2021 00:21) (16 - 20)  SpO2: 93% (08 Feb 2021 00:21) (93% - 98%)    Exam:   MS: Eyes closed. Do not open to verbal or noxious stimuli. No verbal output. Patient does not follow commands.  CN: Patient resists eye opening by examiner. Can see REM under closed lids. Face grossly symmetric.   Motor: Whole body twitching. LUE localizes to pain. Does not withdraw RUE/RLE/LLE to pain.   Sensory: Grimaces to noxious stimuli x4.  Reflexes: 2+ throughout. Babinski absent bilaterally.   Coordination: Unable to assess.   Gait: Unable to assess.    LABS:               13.8   8.36  )-----------( 260      ( 07 Feb 2021 11:41 )             39.0     02-07    141  |  105  |  10  ----------------------------<  114<H>  3.2<L>   |  25  |  0.83    Ca    8.9      07 Feb 2021 18:36  Phos  2.1     02-07  Mg     2.3     02-07    TPro  7.3  /  Alb  4.2  /  TBili  0.6  /  DBili  0.1  /  AST  25  /  ALT  50<H>  /  AlkPhos  53  02-07    RADIOLOGY:    -02/06 CTH: Mild volume loss, microvascular disease, no acute hemorrhage or midline shift. If symptoms persist consider follow-up head CT or MR if no contraindications.

## 2021-02-08 LAB
ALBUMIN SERPL ELPH-MCNC: 4 G/DL — SIGNIFICANT CHANGE UP (ref 3.3–5)
ALP SERPL-CCNC: 49 U/L — SIGNIFICANT CHANGE UP (ref 40–120)
ALT FLD-CCNC: 41 U/L — SIGNIFICANT CHANGE UP (ref 10–45)
ANION GAP SERPL CALC-SCNC: 11 MMOL/L — SIGNIFICANT CHANGE UP (ref 5–17)
ANION GAP SERPL CALC-SCNC: 12 MMOL/L — SIGNIFICANT CHANGE UP (ref 5–17)
ANION GAP SERPL CALC-SCNC: 12 MMOL/L — SIGNIFICANT CHANGE UP (ref 5–17)
AST SERPL-CCNC: 24 U/L — SIGNIFICANT CHANGE UP (ref 10–40)
BASOPHILS # BLD AUTO: 0.02 K/UL — SIGNIFICANT CHANGE UP (ref 0–0.2)
BASOPHILS NFR BLD AUTO: 0.3 % — SIGNIFICANT CHANGE UP (ref 0–2)
BILIRUB SERPL-MCNC: 0.5 MG/DL — SIGNIFICANT CHANGE UP (ref 0.2–1.2)
BUN SERPL-MCNC: 8 MG/DL — SIGNIFICANT CHANGE UP (ref 7–23)
BUN SERPL-MCNC: 9 MG/DL — SIGNIFICANT CHANGE UP (ref 7–23)
BUN SERPL-MCNC: 9 MG/DL — SIGNIFICANT CHANGE UP (ref 7–23)
CALCIUM SERPL-MCNC: 8.7 MG/DL — SIGNIFICANT CHANGE UP (ref 8.4–10.5)
CALCIUM SERPL-MCNC: 9.2 MG/DL — SIGNIFICANT CHANGE UP (ref 8.4–10.5)
CALCIUM SERPL-MCNC: 9.4 MG/DL — SIGNIFICANT CHANGE UP (ref 8.4–10.5)
CHLORIDE SERPL-SCNC: 105 MMOL/L — SIGNIFICANT CHANGE UP (ref 96–108)
CHLORIDE SERPL-SCNC: 105 MMOL/L — SIGNIFICANT CHANGE UP (ref 96–108)
CHLORIDE SERPL-SCNC: 107 MMOL/L — SIGNIFICANT CHANGE UP (ref 96–108)
CO2 SERPL-SCNC: 22 MMOL/L — SIGNIFICANT CHANGE UP (ref 22–31)
CO2 SERPL-SCNC: 23 MMOL/L — SIGNIFICANT CHANGE UP (ref 22–31)
CO2 SERPL-SCNC: 24 MMOL/L — SIGNIFICANT CHANGE UP (ref 22–31)
CREAT SERPL-MCNC: 0.77 MG/DL — SIGNIFICANT CHANGE UP (ref 0.5–1.3)
CREAT SERPL-MCNC: 0.79 MG/DL — SIGNIFICANT CHANGE UP (ref 0.5–1.3)
CREAT SERPL-MCNC: 0.8 MG/DL — SIGNIFICANT CHANGE UP (ref 0.5–1.3)
EOSINOPHIL # BLD AUTO: 0.02 K/UL — SIGNIFICANT CHANGE UP (ref 0–0.5)
EOSINOPHIL NFR BLD AUTO: 0.3 % — SIGNIFICANT CHANGE UP (ref 0–6)
FOLATE SERPL-MCNC: 16.9 NG/ML — SIGNIFICANT CHANGE UP
GLUCOSE BLDC GLUCOMTR-MCNC: 103 MG/DL — HIGH (ref 70–99)
GLUCOSE SERPL-MCNC: 107 MG/DL — HIGH (ref 70–99)
GLUCOSE SERPL-MCNC: 92 MG/DL — SIGNIFICANT CHANGE UP (ref 70–99)
GLUCOSE SERPL-MCNC: 95 MG/DL — SIGNIFICANT CHANGE UP (ref 70–99)
HCT VFR BLD CALC: 38.6 % — LOW (ref 39–50)
HCT VFR BLD CALC: 40.6 % — SIGNIFICANT CHANGE UP (ref 39–50)
HGB BLD-MCNC: 13.6 G/DL — SIGNIFICANT CHANGE UP (ref 13–17)
HGB BLD-MCNC: 14.4 G/DL — SIGNIFICANT CHANGE UP (ref 13–17)
IMM GRANULOCYTES NFR BLD AUTO: 0.7 % — SIGNIFICANT CHANGE UP (ref 0–1.5)
LYMPHOCYTES # BLD AUTO: 2.05 K/UL — SIGNIFICANT CHANGE UP (ref 1–3.3)
LYMPHOCYTES # BLD AUTO: 27.7 % — SIGNIFICANT CHANGE UP (ref 13–44)
MAGNESIUM SERPL-MCNC: 2.2 MG/DL — SIGNIFICANT CHANGE UP (ref 1.6–2.6)
MAGNESIUM SERPL-MCNC: 2.3 MG/DL — SIGNIFICANT CHANGE UP (ref 1.6–2.6)
MCHC RBC-ENTMCNC: 29.4 PG — SIGNIFICANT CHANGE UP (ref 27–34)
MCHC RBC-ENTMCNC: 29.7 PG — SIGNIFICANT CHANGE UP (ref 27–34)
MCHC RBC-ENTMCNC: 35.2 GM/DL — SIGNIFICANT CHANGE UP (ref 32–36)
MCHC RBC-ENTMCNC: 35.5 GM/DL — SIGNIFICANT CHANGE UP (ref 32–36)
MCV RBC AUTO: 83.5 FL — SIGNIFICANT CHANGE UP (ref 80–100)
MCV RBC AUTO: 83.7 FL — SIGNIFICANT CHANGE UP (ref 80–100)
MONOCYTES # BLD AUTO: 0.71 K/UL — SIGNIFICANT CHANGE UP (ref 0–0.9)
MONOCYTES NFR BLD AUTO: 9.6 % — SIGNIFICANT CHANGE UP (ref 2–14)
NEUTROPHILS # BLD AUTO: 4.55 K/UL — SIGNIFICANT CHANGE UP (ref 1.8–7.4)
NEUTROPHILS NFR BLD AUTO: 61.4 % — SIGNIFICANT CHANGE UP (ref 43–77)
NRBC # BLD: 0 /100 WBCS — SIGNIFICANT CHANGE UP (ref 0–0)
NRBC # BLD: 0 /100 WBCS — SIGNIFICANT CHANGE UP (ref 0–0)
PHOSPHATE SERPL-MCNC: 2.2 MG/DL — LOW (ref 2.5–4.5)
PHOSPHATE SERPL-MCNC: 3.6 MG/DL — SIGNIFICANT CHANGE UP (ref 2.5–4.5)
PLATELET # BLD AUTO: 246 K/UL — SIGNIFICANT CHANGE UP (ref 150–400)
PLATELET # BLD AUTO: 254 K/UL — SIGNIFICANT CHANGE UP (ref 150–400)
POTASSIUM SERPL-MCNC: 3.2 MMOL/L — LOW (ref 3.5–5.3)
POTASSIUM SERPL-MCNC: 3.7 MMOL/L — SIGNIFICANT CHANGE UP (ref 3.5–5.3)
POTASSIUM SERPL-MCNC: 3.7 MMOL/L — SIGNIFICANT CHANGE UP (ref 3.5–5.3)
POTASSIUM SERPL-SCNC: 3.2 MMOL/L — LOW (ref 3.5–5.3)
POTASSIUM SERPL-SCNC: 3.7 MMOL/L — SIGNIFICANT CHANGE UP (ref 3.5–5.3)
POTASSIUM SERPL-SCNC: 3.7 MMOL/L — SIGNIFICANT CHANGE UP (ref 3.5–5.3)
PROT SERPL-MCNC: 6.8 G/DL — SIGNIFICANT CHANGE UP (ref 6–8.3)
RBC # BLD: 4.62 M/UL — SIGNIFICANT CHANGE UP (ref 4.2–5.8)
RBC # BLD: 4.85 M/UL — SIGNIFICANT CHANGE UP (ref 4.2–5.8)
RBC # FLD: 12.6 % — SIGNIFICANT CHANGE UP (ref 10.3–14.5)
RBC # FLD: 12.6 % — SIGNIFICANT CHANGE UP (ref 10.3–14.5)
SODIUM SERPL-SCNC: 139 MMOL/L — SIGNIFICANT CHANGE UP (ref 135–145)
SODIUM SERPL-SCNC: 141 MMOL/L — SIGNIFICANT CHANGE UP (ref 135–145)
SODIUM SERPL-SCNC: 141 MMOL/L — SIGNIFICANT CHANGE UP (ref 135–145)
VANCOMYCIN TROUGH SERPL-MCNC: 5.3 UG/ML — LOW (ref 10–20)
VIT B12 SERPL-MCNC: 540 PG/ML — SIGNIFICANT CHANGE UP (ref 232–1245)
WBC # BLD: 7.4 K/UL — SIGNIFICANT CHANGE UP (ref 3.8–10.5)
WBC # BLD: 8.06 K/UL — SIGNIFICANT CHANGE UP (ref 3.8–10.5)
WBC # FLD AUTO: 7.4 K/UL — SIGNIFICANT CHANGE UP (ref 3.8–10.5)
WBC # FLD AUTO: 8.06 K/UL — SIGNIFICANT CHANGE UP (ref 3.8–10.5)

## 2021-02-08 PROCEDURE — 99222 1ST HOSP IP/OBS MODERATE 55: CPT | Mod: GC

## 2021-02-08 PROCEDURE — 99232 SBSQ HOSP IP/OBS MODERATE 35: CPT

## 2021-02-08 PROCEDURE — 70450 CT HEAD/BRAIN W/O DYE: CPT | Mod: 26

## 2021-02-08 RX ORDER — CLONAZEPAM 1 MG
0.5 TABLET ORAL EVERY 12 HOURS
Refills: 0 | Status: DISCONTINUED | OUTPATIENT
Start: 2021-02-09 | End: 2021-02-09

## 2021-02-08 RX ORDER — BUPRENORPHINE AND NALOXONE 2; .5 MG/1; MG/1
1 TABLET SUBLINGUAL DAILY
Refills: 0 | Status: DISCONTINUED | OUTPATIENT
Start: 2021-02-08 | End: 2021-02-09

## 2021-02-08 RX ORDER — POTASSIUM CHLORIDE 20 MEQ
10 PACKET (EA) ORAL
Refills: 0 | Status: COMPLETED | OUTPATIENT
Start: 2021-02-08 | End: 2021-02-08

## 2021-02-08 RX ADMIN — PANTOPRAZOLE SODIUM 40 MILLIGRAM(S): 20 TABLET, DELAYED RELEASE ORAL at 16:04

## 2021-02-08 RX ADMIN — HEPARIN SODIUM 5000 UNIT(S): 5000 INJECTION INTRAVENOUS; SUBCUTANEOUS at 21:29

## 2021-02-08 RX ADMIN — HEPARIN SODIUM 5000 UNIT(S): 5000 INJECTION INTRAVENOUS; SUBCUTANEOUS at 12:22

## 2021-02-08 RX ADMIN — SODIUM CHLORIDE 100 MILLILITER(S): 9 INJECTION, SOLUTION INTRAVENOUS at 07:14

## 2021-02-08 RX ADMIN — Medication 250 MILLIGRAM(S): at 07:17

## 2021-02-08 RX ADMIN — HEPARIN SODIUM 5000 UNIT(S): 5000 INJECTION INTRAVENOUS; SUBCUTANEOUS at 06:58

## 2021-02-08 RX ADMIN — Medication 100 MILLIEQUIVALENT(S): at 11:21

## 2021-02-08 RX ADMIN — Medication 100 MILLIEQUIVALENT(S): at 10:17

## 2021-02-08 RX ADMIN — ONDANSETRON 4 MILLIGRAM(S): 8 TABLET, FILM COATED ORAL at 00:26

## 2021-02-08 RX ADMIN — CEFTRIAXONE 100 MILLIGRAM(S): 500 INJECTION, POWDER, FOR SOLUTION INTRAMUSCULAR; INTRAVENOUS at 06:54

## 2021-02-08 RX ADMIN — Medication 100 MILLIEQUIVALENT(S): at 00:26

## 2021-02-08 RX ADMIN — Medication 1 MILLIGRAM(S): at 06:58

## 2021-02-08 RX ADMIN — Medication 1 MILLIGRAM(S): at 16:04

## 2021-02-08 RX ADMIN — Medication 62.5 MILLIMOLE(S): at 02:13

## 2021-02-08 RX ADMIN — PANTOPRAZOLE SODIUM 40 MILLIGRAM(S): 20 TABLET, DELAYED RELEASE ORAL at 06:57

## 2021-02-08 RX ADMIN — Medication 100 MILLIEQUIVALENT(S): at 12:22

## 2021-02-08 NOTE — CHART NOTE - NSCHARTNOTEFT_GEN_A_CORE
RRT called at 6am today for altered mental status in the form of decreased responsiveness. Upon neurology arrival, patient has decreased responsiveness compared to baseline status during admission (AAOx1-2, more conversive). Patient was forcing his eyes shut, w/ dilated pupils that were reactive. Withdrew to noxious stimuli on all extremities and responsive to sternal rub. Does not verbally respond to name. Was able to  my hand when instructed to. Patient is on CIWA, per primary team. Ativan 2mg IV was given yesterday 6pm. CT head w/o STAT ordered, which was negative for acute pathologies. Neurology day team to see today. RRT called at 6am today for altered mental status in the form of decreased responsiveness. Upon neurology arrival, patient has decreased responsiveness compared to baseline status during admission (AAOx1-2, more conversive). Patient was forcing his eyes shut, w/ dilated pupils that were reactive. Withdrew to noxious stimuli on all extremities and responsive to sternal rub. Does not verbally respond to name. Was able to partially  my hand when instructed to. Babinski mute b/l. No tongue bruising or incontinence. Patient is on CIWA, per primary team. Ativan 2mg IV was given yesterday 6pm. CT head w/o STAT ordered, which was negative for acute pathologies. Neurology day team to see today. RRT called at 6am today for altered mental status in the form of decreased responsiveness. Upon neurology arrival, patient has decreased responsiveness compared to baseline status during admission (AAOx1-2, more conversive). Patient was forcing his eyes shut, w/ dilated pupils that were reactive. Withdrew to noxious stimuli on all extremities and responsive to sternal rub. Does not verbally respond to name. Was able to partially  my hand when instructed to. Babinski mute b/l. No tongue bruising or incontinence. Patient is on CIWA, per primary team. Ativan 2mg IV was given yesterday 6pm. CT head w/o STAT ordered, which was negative for acute pathologies. After CT scan, patient was reported to be improved in mental status with mildly increased responsiveness. Neurology day team to see today. RRT called at 6am today for altered mental status in the form of decreased responsiveness. Upon neurology arrival, patient has decreased responsiveness compared to baseline status during admission (AAOx1-2, more conversive). Patient was forcing his eyes shut, w/ dilated pupils that were reactive. Withdrew to noxious stimuli on all extremities and responsive to sternal rub. Does not verbally respond to name. Was able to partially  my hand when instructed to. Babinski mute b/l. No tongue bruising or incontinence. Patient is on CIWA, per primary team. Ativan 2mg IV was given yesterday 6pm. CT head w/o STAT ordered, which was negative for acute pathologies. After CT scan, patient was reported to be improved in mental status with mildly increased responsiveness. Neurology day team to see today and f/u vEEG report.

## 2021-02-08 NOTE — RAPID RESPONSE TEAM SUMMARY - NSSITUATIONBACKGROUNDRRT_GEN_ALL_CORE
31-yo M w/ PMH of polysubstance abuse d/o, p/w lethargy and vomiting, also with fever. Differential diag includes opioid w/d vs. infectious, currently on ceftriaxone and vancomycin. RRT was called for nonresponsiveness. Upon arrival, hemodynamically stable at /90, HR 69, RR 18, sat 96% RA. Protecting airway. Patient was initially nonresponsive to sternal rub, however over time patient became more responsive and answered simple questions by verbalizing simple words. Neurology was called and examined patient. Followed commands - raising arms and legs upon verbal instruction. Pupils were equal and no pinpoint pupils noted. Neurology on call resident recommended CT brain. As patient was hemodynamically stable and protecting airways, RRT was terminated and ACP was instructed to follow up with neurology after CT scan.
Continuation from previous RRT, please refer to the previous RRT note for details. Another RRT was called for resource utilization to CT scan with c/f patient safety during transport for a patient with altered mental status. Patient was protecting airways. A&O x1-2 (unable to name hospital). HD stable per previous RRT. Patient was transported to CT suite and a head CT was performed. Prelim read was without acute hemorrhage, mass effect, or midline shift. Neurology note appreciated - neuro to follow AM. Instructed ACP to f/u AM labs, EEG, and perform ABG with lactate.

## 2021-02-08 NOTE — EEG REPORT - NS EEG TEXT BOX
[facility]   COMPREHENSIVE EPILEPSY CENTER   REPORT OF LONG-TERM VIDEO EEG     [facility]: [address], [place] [address], Ph#: [phone]    Patient Name: PAYAL BULLOCK  Age and : 31y ([birth year])  MRN #: [id number]  Location: [place] [address]  Referring Physician: Luis Powell    Start Time/Date: 17:42 on 21  End Time/Date: 08:00 on 21  Duration: 12 H 34 minutes     _____________________________________________________________  STUDY INFORMATION    EEG Recording Technique:  The patient underwent continuous Video-EEG monitoring, using Telemetry System hardware on the XLTek Digital System. EEG and video data were stored on a computer hard drive with important events saved in digital archive files. The material was reviewed by a physician (electroencephalographer / epileptologist) on a daily basis. Bharat and seizure detection algorithms were utilized and reviewed. An EEG Technician attended to the patient, and was available throughout daytime work hours.  The epilepsy center neurologist was available in person or on call 24-hours per day.    EEG Placement and Labeling of Electrodes:  The EEG was performed utilizing 20 channel referential EEG connections (coronal over temporal over parasagittal montage) using all standard 10-20 electrode placements with EKG, with additional electrodes placed in the inferior temporal region using the modified 10-10 montage electrode placements for elective admissions, or if deemed necessary. Recording was at a sampling rate of 256 samples per second per channel. Time synchronized digital video recording was done simultaneously with EEG recording. A low light infrared camera was used for low light recording.     _____________________________________________________________  HISTORY    Patient is a 31y old  Male who presents with a chief complaint of lethargic / vomiting / fever 101 F.       PERTINENT MEDICATION:  clonazePAM  Tablet 1 milliGRAM(s) Oral two times a day  _____________________________________________________________  STUDY INTERPRETATION    Findings: The background was continuous, spontaneously variable and reactive. During wakefulness, the posterior dominant rhythm consisted of symmetric, well-modulated 8 Hz activity, with amplitude to 30 uV, that attenuated to eye opening.  Low amplitude frontal beta was noted in wakefulness.    Background Slowing:  No generalized background slowing was present.    Focal Slowing:   None were present.    Sleep Background:  Drowsiness and stage II sleep transients were not recorded.    Other Non-Epileptiform Findings:  None were present.    Interictal Epileptiform Activity:   None were present.    Events:  Clinical events: None recorded.  Seizures: None recorded.    Activation Procedures:   Hyperventilation was not performed.    Photic stimulation was not performed.     Artifacts:  Intermittent myogenic and movement artifacts were noted.    ECG:  artifact    _____________________________________________________________  EEG SUMMARY/CLASSIFICATION    Abnormal EEG in the awake state     - Mild generalized slowing.    _____________________________________________________________  EEG IMPRESSION/CLINICAL CORRELATE    Abnormal EEG study.  1. Mild nonspecific diffuse or multifocal cerebral dysfunction.   2. No epileptiform pattern or seizure seen.    * Limited evaluation due to artifact, no video recorded.   _____________________________________________________________    Prelim read   Erlin Gaspar   Epilepsy Fellow  [facility]    Epilepsy Reading Room Ph# [phone]  Epilepsy Answering Service after 5 pm and before 8:30 am: Ph# [phone] [facility]   COMPREHENSIVE EPILEPSY CENTER   REPORT OF LONG-TERM VIDEO EEG     [facility]: [address], [address], [place] [address], Ph#: [phone]    Patient Name: PAYAL BULLOCK  Age and : 31y (89)  MRN #: [id number]  Location: [place] [address]  Referring Physician: Luis Powell    Start Time/Date: 16:59 on 21  End Time/Date: 08:00 on 21  Duration: 12 H 48 minutes     _____________________________________________________________  STUDY INFORMATION    EEG Recording Technique:  The patient underwent continuous Video-EEG monitoring, using Telemetry System hardware on the XLTek Digital System. EEG and video data were stored on a computer hard drive with important events saved in digital archive files. The material was reviewed by a physician (electroencephalographer / epileptologist) on a daily basis. Bharat and seizure detection algorithms were utilized and reviewed. An EEG Technician attended to the patient, and was available throughout daytime work hours.  The epilepsy center neurologist was available in person or on call 24-hours per day.    EEG Placement and Labeling of Electrodes:  The EEG was performed utilizing 20 channel referential EEG connections (coronal over temporal over parasagittal montage) using all standard 10-20 electrode placements with EKG, with additional electrodes placed in the inferior temporal region using the modified 10-10 montage electrode placements for elective admissions, or if deemed necessary. Recording was at a sampling rate of 256 samples per second per channel. Time synchronized digital video recording was done simultaneously with EEG recording. A low light infrared camera was used for low light recording.     _____________________________________________________________  HISTORY  Patient is a 31y old  Male who presents with a chief complaint of lethargic / vomiting / fever 101 F.     PERTINENT MEDICATION:  clonazePAM  Tablet 1 milliGRAM(s) Oral two times a day  _____________________________________________________________  STUDY INTERPRETATION    Findings: The background was continuous, spontaneously variable and reactive. During wakefulness, the posterior dominant rhythm consisted of symmetric, 8 Hz activity, with amplitude to 30 uV, that attenuated to eye opening.  Low amplitude frontal beta was noted in wakefulness.    Background Slowing:  Diffuse theta background slowing was present.    Focal Slowing:   None were present.    Sleep Background:  Drowsiness and stage II sleep transients were not recorded.    Other Non-Epileptiform Findings:  None were present.    Interictal Epileptiform Activity:   None were present.    Events:  Clinical events: None recorded.  Seizures: None recorded.    Activation Procedures:   Hyperventilation was not performed.    Photic stimulation was not performed.     Artifacts:  Very prominent myogenic and movement artifacts were noted.    ECG:  artifact    _____________________________________________________________  EEG SUMMARY/CLASSIFICATION  Abnormal EEG in the awake state   - Mild generalized slowing.    _____________________________________________________________  EEG IMPRESSION/CLINICAL CORRELATE  Abnormal EEG study.  1. Mild nonspecific diffuse or multifocal cerebral dysfunction.   2. No epileptiform pattern or seizure seen.    * Limited evaluation.  Very prominent myogenic and movement artifacts were noted. No video recorded.   _____________________________________________________________    Erlin Gaspar   Epilepsy Fellow  [facility]    Epilepsy Reading Room Ph# [phone]  Epilepsy Answering Service after 5 pm and before 8:30 am: Ph# [phone] [facility]   COMPREHENSIVE EPILEPSY CENTER   REPORT OF LONG-TERM VIDEO EEG     [facility]: [address], [place] [address], Ph#: [phone]    Patient Name: PAYAL BULLOCK  Age and : 31y (89)  MRN #: [id number]  Location: [place] [address]  Referring Physician: Luis Powell    Start Time/Date: 16:59 on 21  End Time/Date: 10:52 on 21  Duration: 15 H 48 minutes     _____________________________________________________________  STUDY INFORMATION    EEG Recording Technique:  The patient underwent continuous Video-EEG monitoring, using Telemetry System hardware on the XLTek Digital System. EEG and video data were stored on a computer hard drive with important events saved in digital archive files. The material was reviewed by a physician (electroencephalographer / epileptologist) on a daily basis. Bharat and seizure detection algorithms were utilized and reviewed. An EEG Technician attended to the patient, and was available throughout daytime work hours.  The epilepsy center neurologist was available in person or on call 24-hours per day.    EEG Placement and Labeling of Electrodes:  The EEG was performed utilizing 20 channel referential EEG connections (coronal over temporal over parasagittal montage) using all standard 10-20 electrode placements with EKG, with additional electrodes placed in the inferior temporal region using the modified 10-10 montage electrode placements for elective admissions, or if deemed necessary. Recording was at a sampling rate of 256 samples per second per channel. Time synchronized digital video recording was done simultaneously with EEG recording. A low light infrared camera was used for low light recording.     _____________________________________________________________  HISTORY  Patient is a 31y old  Male who presents with a chief complaint of lethargic / vomiting / fever 101 F.     PERTINENT MEDICATION:  clonazePAM  Tablet 1 milliGRAM(s) Oral two times a day  _____________________________________________________________  STUDY INTERPRETATION    Findings: The background was continuous, spontaneously variable and reactive. During wakefulness, the posterior dominant rhythm consisted of symmetric, 8 Hz activity, with amplitude to 30 uV, that attenuated to eye opening.  Low amplitude frontal beta was noted in wakefulness.    Background Slowing:  Diffuse theta background slowing was present.    Focal Slowing:   None were present.    Sleep Background:  Drowsiness and stage II sleep transients were not recorded.    Other Non-Epileptiform Findings:  None were present.    Interictal Epileptiform Activity:   None were present.    Events:  Clinical events: None recorded.  Seizures: None recorded.    Activation Procedures:   Hyperventilation was not performed.    Photic stimulation was not performed.     Artifacts:  Very prominent myogenic and movement artifacts were noted.    ECG:  artifact    _____________________________________________________________  EEG SUMMARY/CLASSIFICATION  Abnormal EEG in the awake state   - Mild generalized slowing.    _____________________________________________________________  EEG IMPRESSION/CLINICAL CORRELATE  Abnormal EEG study.  1. Mild nonspecific diffuse or multifocal cerebral dysfunction.   2. No epileptiform pattern or seizure seen.    * Limited evaluation.  Very prominent myogenic and movement artifacts were noted. No video recorded.   _____________________________________________________________    Erlin Gaspar   Epilepsy Fellow  [facility]    Epilepsy Reading Room Ph# [phone]  Epilepsy Answering Service after 5 pm and before 8:30 am: Ph# [phone]

## 2021-02-08 NOTE — PROGRESS NOTE ADULT - ASSESSMENT
32yo M pmhx opioid use disorder p/w multiple complaints including intermittent SOB, nausea/vomiting and intermittent memory issues, pt. reports last opioid use was "a couple of days ago" per primary team, patient concerned for infection with COVID-19 but recently tested negative. pt. was thought for opioid withdrawal s/p 2mg buprenorphine with little improvement, however after escalating treatment with suboxone patient remains symptomatic and intermittently confused. Now with rectal temp to 101.  No fevers since  workup including Lp negative  patient stable  ? from withdrawl  would rec Dc abx and observe  Dount need for Ct or echo-unless further fevers-clinical picture not c/w infection    Will tailor plan for ID issues  per course,results.Will defer to primary team on management of other issues.  Assessment, plan and recommendations as detailed above were discussed with the medical/primary  team.  Will Follow.  Beeper [phone] [facility] [id number].   Wknd/afterhours/No response-[phone] or Fellow on call

## 2021-02-08 NOTE — CHART NOTE - NSCHARTNOTEFT_GEN_A_CORE
PA MEDICINE NOTE:    RN called for RRT because found pt to be unresponsive, less alert than his usual.  RRT team came and ordered ABG, labs, CT head STAT, and BC.   Pls f/u with the results.   Neuro came to see pt as well during the rapid and will endorse to day shift.     - Family member (sister) has been informed   - Called Dr. Powell left voicemail   - Will endorse to AM team       Karl Rodríguez  Dept of Medicine   [id number] PA MEDICINE NOTE:    RN called for RRT because found pt to be unresponsive, less alert than his usual.   RRT team came and ordered ABG w/ lytes, labs, CT head STAT, and BC- Pls f/u with the results.   Pt was hemodynamically stable during RRT.   Neuro came to see pt as well during the rapid and will endorse to day shift and will follow with EEG.     - Family member (sister) has been informed   - Called Dr. Powell left voicemail   - Will endorse to AM team       Karl Rodríguez  Dept of Medicine   [id number] PA MEDICINE NOTE:    RN called for RRT because found pt to be unresponsive, less alert than his usual.   RRT team came and ordered ABG w/ lytes, labs, CT head STAT, and BC- Pls f/u with the results. Prelim read of CT head was without acute hemorrhage, mass effect, or midline shift.  Pt was hemodynamically stable during RRT.   Neuro came to see pt as well during the rapid - Neurology note appreciated - neuro to follow AM.    - Family member (sister) has been informed   - Called Dr. Powell left voicemail   - Will endorse to AM team       Karl Rodríguez  Dept of Medicine   [id number]

## 2021-02-08 NOTE — PROVIDER CONTACT NOTE (OTHER) - SITUATION
Pt CIWA score 16
Unable to complete CIWA assessment.
Pt CIWA score 8.
CIWA Score 14. Pt states no auditory hallucinations but can be heard to staff talking to someone when no one is present in room.
Rectal temp 101.1F
Patient is febrile 38.1c at 0108
Pt CIWA score 16.

## 2021-02-08 NOTE — PROGRESS NOTE BEHAVIORAL HEALTH - NSBHCHARTREVIEWINVESTIGATE_PSY_A_CORE FT
QTc 450 e< from: 12 Lead ECG (02.06.21 @ 09:59) >    Ventricular Rate 67 BPM  Atrial Rate 67 BPM  P-R Interval 130 ms    QRS Duration 104 ms    Q-T Interval 426 ms    QTC Calculation(Bazett) 450 ms    P Axis 42 degrees    R Axis 57 degrees    T Axis 67 degrees    Diagnosis Line NORMAL SINUS RHYTHM WITH SINUS ARRHYTHMIA  NORMAL ECG  WHEN COMPARED WITH ECG OF 30-MAY-2010 21:57,  sinus rate has increased  Confirmed by MD MILLER RAMAN ([id number]) on 2/6/2021 3:15:10 PM    < end of copied text >    < from: 12 Lead ECG (02.06.21 @ 09:59) >      Ventricular Rate 67 BPM    Atrial Rate 67 BPM    P-R Interval 130 ms    QRS Duration 104 ms    Q-T Interval 426 ms    QTC Calculation(Bazett) 450 ms    P Axis 42 degrees    R Axis 57 degrees    T Axis 67 degrees    Diagnosis Line NORMAL SINUS RHYTHM WITH SINUS ARRHYTHMIA  NORMAL ECG  WHEN COMPARED WITH ECG OF 30-MAY-2010 21:57,  sinus rate has increased  Confirmed by MD MILLER RAMAN ([id number]) on 2/6/2021 3:15:10 PM    < end of copied text >

## 2021-02-08 NOTE — PROGRESS NOTE ADULT - ASSESSMENT
a/p  32yo M pmhx opioid use disorder p/w multiple complaints including intermittent SOB, nausea/vomiting and intermittent memory issues    1. Intermittent dyspnea  -no clinical chf, volume overload  -CXR with clear lungs  -EKG without ischemic changes  -pos tox screen noted   -await echo to rule out LV or valvular dysfunction     2. AMS  -RRT/events noted  -etiology unclear  -CT head neg  -s/p LP   -low grade fevers noted  -w/u per med/ID/neuro  -EEG pending  -no arrhythmia to account for episodes     dvt ppx

## 2021-02-08 NOTE — PROGRESS NOTE BEHAVIORAL HEALTH - SUMMARY
- presentation inconsistent with substance withdrawal   - presentation inconsistent with a primary psychiatric process  - mental status with somnolence, sedation - Pt is not on any sedating medication at this time that would explain this presentation   - cannot exclude IV drug use given history   - ABG results? repeat?  - CT head findings of Mild volume loss, microvascular disease inconsistent with young age; could be secondary effects of polysubstance abuse. Would get MRI   - consider ID consult Pt is a 30 y/o man with hx of substance abuse including relapse on Heroin since one year ago when he stopped taking Suboxone and Xanax 2-4mg daily intermittently, p/w AMS, lethargy and hallucinations. Pt today seen by team, is awake and conversational. Pt admits to snorting heroin prior to coming to the hospital. This is his drug of choice, denies any other drug use, although urine tox was also positive for THC.   Pt complains mainly of recent difficulty with short term memory, and symptoms of WD such as pain and nausea. On initial assessment there was concern for possible withdrawal seizure activity for which he was started on Klonopin 1mg BID.   On exam he has myoclonus and whole body tremor, he is awake, fully oriented, speech normal fluent, follows commands, strength intact throughout, CN all intact, reflexes 2+ throughout.   - CT head findings of Mild volume loss, microvascular disease inconsistent with young age; could be secondary effects of polysubstance abuse.   Consider MRI  Restart Suboxone 8mg daily.   Decrease Clonazepam to 0.5mg po BID tomorrow.

## 2021-02-08 NOTE — PROVIDER CONTACT NOTE (OTHER) - DATE AND TIME:
07-Feb-2021 22:35
08-Feb-2021 00:05
07-Feb-2021 22:00
07-Feb-2021 01:20
07-Feb-2021 21:10
08-Feb-2021 03:25
08-Feb-2021 01:30

## 2021-02-08 NOTE — PROVIDER CONTACT NOTE (CHANGE IN STATUS NOTIFICATION) - ASSESSMENT
Pt A&Ox0. Pt responding to sternal rub. Pt not following simple commands. Pt not talking. Pt not opening eyes. Pt eyes responding to light. Pt eyes darting and roll back. Pt VS completed see flow sheet.

## 2021-02-08 NOTE — PROVIDER CONTACT NOTE (OTHER) - BACKGROUND
30 y/o M with PMHx of Polysubstance abuse p/w short term memory loss. Pt states that he snorts heroin (No IVDA) last time using was 2-3 days ago.
30 y/o M with PMHx of Polysubstance abuse p/w short term memory loss. Pt states that he snorts heroin (No IVDA) last time using was 2-3 days ago
30 y/o M with PMHx of Polysubstance abuse p/w short term memory loss. Pt states that he snorts heroin (No IVDA) last time using was 2-3 days ago.
32 y/o M with PMHx of Polysubstance abuse p/w short term memory loss. Pt states that he snorts heroin (No IVDA) last time using was 2-3 days ago.
Encephalopathy, polysubstance drug abuse

## 2021-02-08 NOTE — PROVIDER CONTACT NOTE (OTHER) - RECOMMENDATIONS
Tylenol IV
Notify provider. Assess patient. Ativan PRN.
Tylenol ?
Continue monitoring pt mentation and VS.
Continue monitoring.
Ativan?
Notify provider. Come assess patient.

## 2021-02-08 NOTE — PROVIDER CONTACT NOTE (OTHER) - ASSESSMENT
Pt A&Ox2. Pt VS taken see flow sheet. Pt shaking.
Pt not responding to questions being asked. Pt will not follow simple commands. Pt continues to appear to be talking to someone without anyone being there. Pt stated "HR this is Julio speaking.  I can help log you in and make a copy of it". Pt has visible tremors. VS completed, see flow sheet.
Pt A&Ox2. Pt shaking when arms are lifted. Pt stated he was at a championship game. Pt denies pain/discomfort. Pt pulled SpO2 probe off. Pt pulled IV out of hand.
Pt not answering questions. Pt not following simple commands such as opening eyes. Pt appears to be having conversation with someone. Pt stated "it must be a language barrier" and proceeded to spell the word language. Pts speech is clear however does not make sense. VS Complete, see flow sheet. Nurse manager Liv santiago during assessment.
Pt VS; 99.9 Axillary, 76bpm, 121/60, 94% O2 RA. Pt A&O2. Pt denies pain/ discomfort. Pt has tremors. Pt having talking with no one in room. Overnight EEG in progress.
Patient has no chills ,rigors.
VS completed. See flow sheet.

## 2021-02-08 NOTE — PROVIDER CONTACT NOTE (OTHER) - REASON
CIWA score 16
Unable to complete CIWA assessment.
fever
CIWA Score 14
Pt CIWA score 16
Rectal temp 101.1F
CIWA score 8

## 2021-02-08 NOTE — PROVIDER CONTACT NOTE (CHANGE IN STATUS NOTIFICATION) - ACTION/TREATMENT ORDERED:
PA notified and aware. RRT called. CTH completed. ABG ordered. Neuro following. Psych consult pending.

## 2021-02-08 NOTE — PROGRESS NOTE BEHAVIORAL HEALTH - NSBHCHARTREVIEWVS_PSY_A_CORE FT
T(C): 37.8 (02-07-21 @ 12:43), Max: 38.5 (02-06-21 @ 19:00)  HR: 75 (02-07-21 @ 12:43) (53 - 75)  BP: 147/76 (02-07-21 @ 12:43) (111/77 - 162/75)  RR: 16 (02-07-21 @ 12:43) (15 - 18)  SpO2: 93% (02-07-21 @ 12:43) (93% - 99%) Vital Signs Last 24 Hrs  T(C): 36.8 (08 Feb 2021 16:10), Max: 38.4 (07 Feb 2021 22:31)  T(F): 98.3 (08 Feb 2021 16:10), Max: 101.1 (07 Feb 2021 22:31)  HR: 79 (08 Feb 2021 16:10) (56 - 85)  BP: 124/78 (08 Feb 2021 16:10) (121/60 - 166/97)  BP(mean): --  RR: 18 (08 Feb 2021 16:10) (18 - 20)  SpO2: 96% (08 Feb 2021 16:10) (93% - 98%)

## 2021-02-08 NOTE — PROGRESS NOTE BEHAVIORAL HEALTH - NSBHFUPINTERVALHXFT_PSY_A_CORE
Patient says that he had been using heroin and Xanax, and thinks he may have taken Xanax 2mg twice in one day which he takes intermittently.  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  He says that he had been doing well on Suboxone 8/2mg SL TID in the past but gradually tapered off his dose and stopped taking it.  He admits that he relapsed and started using Heroin again for the past year.  He is interested in restarting the Suboxone now.

## 2021-02-08 NOTE — PROVIDER CONTACT NOTE (OTHER) - ACTION/TREATMENT ORDERED:
Provider aware and notified. Continue current plan of care.
Provider aware and notified. New IV placed. On continuous pulse ox.
Provider notified and aware. Will try CIWA assessment again in one hour.
will order Tylenol IV
Provider aware and notified. Continue monitoring. Pt safety maintained.
Provider aware and notified. Tylenol PO administered as per PA order. Continue monitoring VS.
Provider notified and aware. Concerned for respiratory depression. No intervention ordered. Will continue to assess and monitor.

## 2021-02-08 NOTE — PROGRESS NOTE BEHAVIORAL HEALTH - NSBHCHARTREVIEWLAB_PSY_A_CORE FT
02-07    141  |  105  |  13  ----------------------------<  116<H>  2.9<LL>   |  23  |  0.83    Ca    9.3      07 Feb 2021 11:41    TPro  6.8  /  Alb  4.3  /  TBili  0.6  /  DBili  x   /  AST  23  /  ALT  50<H>  /  AlkPhos  53  02-07 13.6   7.40  )-----------( 246      ( 08 Feb 2021 05:40 )             38.6   02-08    141  |  107  |  8   ----------------------------<  92  3.7   |  23  |  0.79    Ca    9.2      08 Feb 2021 14:27  Phos  3.6     02-08  Mg     2.2     02-08    TPro  6.8  /  Alb  4.0  /  TBili  0.5  /  DBili  x   /  AST  24  /  ALT  41  /  AlkPhos  49  02-08

## 2021-02-08 NOTE — PROVIDER CONTACT NOTE (CHANGE IN STATUS NOTIFICATION) - BACKGROUND
32 y/o M with PMHx of Polysubstance abuse p/w short term memory loss. Pt states that he snorts heroin (No IVDA) last time using was 2-3 days ago.

## 2021-02-09 LAB
ANION GAP SERPL CALC-SCNC: 11 MMOL/L — SIGNIFICANT CHANGE UP (ref 5–17)
BUN SERPL-MCNC: 8 MG/DL — SIGNIFICANT CHANGE UP (ref 7–23)
CALCIUM SERPL-MCNC: 9.3 MG/DL — SIGNIFICANT CHANGE UP (ref 8.4–10.5)
CHLORIDE SERPL-SCNC: 106 MMOL/L — SIGNIFICANT CHANGE UP (ref 96–108)
CO2 SERPL-SCNC: 25 MMOL/L — SIGNIFICANT CHANGE UP (ref 22–31)
CREAT SERPL-MCNC: 0.85 MG/DL — SIGNIFICANT CHANGE UP (ref 0.5–1.3)
CULTURE RESULTS: SIGNIFICANT CHANGE UP
GLUCOSE SERPL-MCNC: 94 MG/DL — SIGNIFICANT CHANGE UP (ref 70–99)
HCT VFR BLD CALC: 40.6 % — SIGNIFICANT CHANGE UP (ref 39–50)
HGB BLD-MCNC: 14.4 G/DL — SIGNIFICANT CHANGE UP (ref 13–17)
MAGNESIUM SERPL-MCNC: 2.4 MG/DL — SIGNIFICANT CHANGE UP (ref 1.6–2.6)
MCHC RBC-ENTMCNC: 29.3 PG — SIGNIFICANT CHANGE UP (ref 27–34)
MCHC RBC-ENTMCNC: 35.5 GM/DL — SIGNIFICANT CHANGE UP (ref 32–36)
MCV RBC AUTO: 82.5 FL — SIGNIFICANT CHANGE UP (ref 80–100)
NRBC # BLD: 0 /100 WBCS — SIGNIFICANT CHANGE UP (ref 0–0)
PHOSPHATE SERPL-MCNC: 3.3 MG/DL — SIGNIFICANT CHANGE UP (ref 2.5–4.5)
PLATELET # BLD AUTO: 282 K/UL — SIGNIFICANT CHANGE UP (ref 150–400)
POTASSIUM SERPL-MCNC: 3 MMOL/L — LOW (ref 3.5–5.3)
POTASSIUM SERPL-SCNC: 3 MMOL/L — LOW (ref 3.5–5.3)
RBC # BLD: 4.92 M/UL — SIGNIFICANT CHANGE UP (ref 4.2–5.8)
RBC # FLD: 12.7 % — SIGNIFICANT CHANGE UP (ref 10.3–14.5)
SODIUM SERPL-SCNC: 142 MMOL/L — SIGNIFICANT CHANGE UP (ref 135–145)
SPECIMEN SOURCE: SIGNIFICANT CHANGE UP
WBC # BLD: 8.01 K/UL — SIGNIFICANT CHANGE UP (ref 3.8–10.5)
WBC # FLD AUTO: 8.01 K/UL — SIGNIFICANT CHANGE UP (ref 3.8–10.5)

## 2021-02-09 PROCEDURE — 99232 SBSQ HOSP IP/OBS MODERATE 35: CPT

## 2021-02-09 RX ORDER — CLONAZEPAM 1 MG
0.5 TABLET ORAL THREE TIMES A DAY
Refills: 0 | Status: DISCONTINUED | OUTPATIENT
Start: 2021-02-10 | End: 2021-02-10

## 2021-02-09 RX ORDER — BUPRENORPHINE AND NALOXONE 2; .5 MG/1; MG/1
1 TABLET SUBLINGUAL
Refills: 0 | Status: DISCONTINUED | OUTPATIENT
Start: 2021-02-09 | End: 2021-02-11

## 2021-02-09 RX ORDER — POTASSIUM CHLORIDE 20 MEQ
40 PACKET (EA) ORAL EVERY 4 HOURS
Refills: 0 | Status: COMPLETED | OUTPATIENT
Start: 2021-02-09 | End: 2021-02-09

## 2021-02-09 RX ADMIN — Medication 40 MILLIEQUIVALENT(S): at 09:04

## 2021-02-09 RX ADMIN — HEPARIN SODIUM 5000 UNIT(S): 5000 INJECTION INTRAVENOUS; SUBCUTANEOUS at 05:38

## 2021-02-09 RX ADMIN — SODIUM CHLORIDE 100 MILLILITER(S): 9 INJECTION, SOLUTION INTRAVENOUS at 22:58

## 2021-02-09 RX ADMIN — HEPARIN SODIUM 5000 UNIT(S): 5000 INJECTION INTRAVENOUS; SUBCUTANEOUS at 22:57

## 2021-02-09 RX ADMIN — HEPARIN SODIUM 5000 UNIT(S): 5000 INJECTION INTRAVENOUS; SUBCUTANEOUS at 13:32

## 2021-02-09 RX ADMIN — PANTOPRAZOLE SODIUM 40 MILLIGRAM(S): 20 TABLET, DELAYED RELEASE ORAL at 16:53

## 2021-02-09 RX ADMIN — Medication 0.5 MILLIGRAM(S): at 16:53

## 2021-02-09 RX ADMIN — Medication 0.5 MILLIGRAM(S): at 05:38

## 2021-02-09 RX ADMIN — PANTOPRAZOLE SODIUM 40 MILLIGRAM(S): 20 TABLET, DELAYED RELEASE ORAL at 05:38

## 2021-02-09 RX ADMIN — BUPRENORPHINE AND NALOXONE 1 TABLET(S): 2; .5 TABLET SUBLINGUAL at 11:16

## 2021-02-09 RX ADMIN — BUPRENORPHINE AND NALOXONE 1 TABLET(S): 2; .5 TABLET SUBLINGUAL at 22:57

## 2021-02-09 RX ADMIN — Medication 40 MILLIEQUIVALENT(S): at 13:31

## 2021-02-09 NOTE — PROGRESS NOTE BEHAVIORAL HEALTH - NSBHCHARTREVIEWVS_PSY_A_CORE FT
Vital Signs Last 24 Hrs  T(C): 37 (09 Feb 2021 11:36), Max: 37.1 (08 Feb 2021 21:40)  T(F): 98.6 (09 Feb 2021 11:36), Max: 98.7 (08 Feb 2021 21:40)  HR: 106 (09 Feb 2021 11:36) (54 - 106)  BP: 147/81 (09 Feb 2021 11:36) (128/80 - 147/81)  BP(mean): --  RR: 18 (09 Feb 2021 11:36) (18 - 18)  SpO2: 96% (09 Feb 2021 11:36) (96% - 98%)

## 2021-02-09 NOTE — PROGRESS NOTE BEHAVIORAL HEALTH - NSBHCHARTREVIEWINVESTIGATE_PSY_A_CORE FT
e< from: 12 Lead ECG (02.06.21 @ 09:59) >    Ventricular Rate 67 BPM  Atrial Rate 67 BPM  P-R Interval 130 ms    QRS Duration 104 ms    Q-T Interval 426 ms    QTC Calculation(Bazett) 450 ms    P Axis 42 degrees    R Axis 57 degrees    T Axis 67 degrees    Diagnosis Line NORMAL SINUS RHYTHM WITH SINUS ARRHYTHMIA  NORMAL ECG  WHEN COMPARED WITH ECG OF 30-MAY-2010 21:57,  sinus rate has increased  Confirmed by MD MILLER RAMAN ([id number]) on 2/6/2021 3:15:10 PM    < end of copied text >    < from: 12 Lead ECG (02.06.21 @ 09:59) >      Ventricular Rate 67 BPM    Atrial Rate 67 BPM    P-R Interval 130 ms    QRS Duration 104 ms    Q-T Interval 426 ms    QTC Calculation(Bazett) 450 ms    P Axis 42 degrees    R Axis 57 degrees    T Axis 67 degrees    Diagnosis Line NORMAL SINUS RHYTHM WITH SINUS ARRHYTHMIA  NORMAL ECG  WHEN COMPARED WITH ECG OF 30-MAY-2010 21:57,  sinus rate has increased  Confirmed by MD MILLER RAMAN ([id number]) on 2/6/2021 3:15:10 PM    < end of copied text >

## 2021-02-09 NOTE — PROGRESS NOTE BEHAVIORAL HEALTH - SECONDARY DX2
Polysubstance (including opioids) dependence with physiological dependence
Polysubstance (including opioids) dependence with physiological dependence

## 2021-02-09 NOTE — PROGRESS NOTE BEHAVIORAL HEALTH - NSBHCHARTREVIEWLAB_PSY_A_CORE FT
13.6   7.40  )-----------( 246      ( 08 Feb 2021 05:40 )             38.6   02-08    141  |  107  |  8   ----------------------------<  92  3.7   |  23  |  0.79    Ca    9.2      08 Feb 2021 14:27  Phos  3.6     02-08  Mg     2.2     02-08    TPro  6.8  /  Alb  4.0  /  TBili  0.5  /  DBili  x   /  AST  24  /  ALT  41  /  AlkPhos  49  02-08

## 2021-02-09 NOTE — PROGRESS NOTE BEHAVIORAL HEALTH - NSBHFUPINTERVALHXFT_PSY_A_CORE
Patient says that he had been using heroin and Xanax, and thinks he may have taken Xanax 2mg twice in one day which he takes intermittently.  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  He says that he had been doing well on Suboxone 8/2mg SL TID in the past but gradually tapered off his dose and stopped taking it.  He admits that he relapsed and started using Heroin again for the past year.  He is now in agreement with increasing the Suboxone to twice daily.

## 2021-02-09 NOTE — PROGRESS NOTE ADULT - ASSESSMENT
a/p  32yo M pmhx opioid use disorder p/w multiple complaints including intermittent SOB, nausea/vomiting and intermittent memory issues    1. Intermittent dyspnea  -no clinical chf, volume overload  -CXR with clear lungs  -EKG without ischemic changes  -tox screen noted   -await echo to rule out LV or valvular dysfunction     2. AMS  -RRT/events noted  -etiology unclear  -CT head neg  -s/p LP   -lafebrile  -observe off abx per ID  -no arrhythmia to account for episodes     dvt ppx

## 2021-02-09 NOTE — PROGRESS NOTE ADULT - ASSESSMENT
30yo M pmhx opioid use disorder p/w multiple complaints including intermittent SOB, nausea/vomiting and intermittent memory issues, pt. reports last opioid use was "a couple of days ago" per primary team, patient concerned for infection with COVID-19 but recently tested negative. pt. was thought for opioid withdrawal s/p 2mg buprenorphine with little improvement, however after escalating treatment with suboxone patient remains symptomatic and intermittently confused. Now with rectal temp to 101.  No fevers since  workup including Lp negative  patient stable  ? from withdrawl  stable off abx  Will defer to primary team on management of other issues.  Assessment, plan and recommendations as detailed above were discussed with the medical/primary  team.  ID will follow as needed,please call [phone] if any questions or issues.

## 2021-02-09 NOTE — PROGRESS NOTE BEHAVIORAL HEALTH - SUMMARY
Pt is a 30 y/o man with hx of substance abuse including relapse on Heroin since one year ago when he stopped taking Suboxone and Xanax 2-4mg daily intermittently, p/w AMS, lethargy and hallucinations. Pt today seen by team, is awake and conversational. Pt admits to snorting heroin prior to coming to the hospital. This is his drug of choice, denies any other drug use, although urine tox was also positive for THC.   Pt complains mainly of recent difficulty with short term memory, and symptoms of WD such as pain and nausea. On initial assessment there was concern for possible withdrawal seizure activity for which he was started on Klonopin 1mg BID.   On exam he has myoclonus and whole body tremor, he is awake, fully oriented, speech normal fluent, follows commands, strength intact throughout, CN all intact, reflexes 2+ throughout.   - CT head findings of Mild volume loss, microvascular disease inconsistent with young age; could be secondary effects of polysubstance abuse.   Consider MRI  increase Suboxone 8mg SL BID.   Clonazepam to 0.5mg po TID and may taper gradually.

## 2021-02-10 LAB
ANION GAP SERPL CALC-SCNC: 13 MMOL/L — SIGNIFICANT CHANGE UP (ref 5–17)
BUN SERPL-MCNC: 10 MG/DL — SIGNIFICANT CHANGE UP (ref 7–23)
CALCIUM SERPL-MCNC: 9.3 MG/DL — SIGNIFICANT CHANGE UP (ref 8.4–10.5)
CHLORIDE SERPL-SCNC: 107 MMOL/L — SIGNIFICANT CHANGE UP (ref 96–108)
CO2 SERPL-SCNC: 22 MMOL/L — SIGNIFICANT CHANGE UP (ref 22–31)
CREAT SERPL-MCNC: 0.78 MG/DL — SIGNIFICANT CHANGE UP (ref 0.5–1.3)
GLUCOSE SERPL-MCNC: 88 MG/DL — SIGNIFICANT CHANGE UP (ref 70–99)
HCT VFR BLD CALC: 40.2 % — SIGNIFICANT CHANGE UP (ref 39–50)
HGB BLD-MCNC: 14.2 G/DL — SIGNIFICANT CHANGE UP (ref 13–17)
MAGNESIUM SERPL-MCNC: 2.3 MG/DL — SIGNIFICANT CHANGE UP (ref 1.6–2.6)
MCHC RBC-ENTMCNC: 29.5 PG — SIGNIFICANT CHANGE UP (ref 27–34)
MCHC RBC-ENTMCNC: 35.3 GM/DL — SIGNIFICANT CHANGE UP (ref 32–36)
MCV RBC AUTO: 83.4 FL — SIGNIFICANT CHANGE UP (ref 80–100)
NMDAR IGG TITR CSF IF: SIGNIFICANT CHANGE UP
NRBC # BLD: 0 /100 WBCS — SIGNIFICANT CHANGE UP (ref 0–0)
PHOSPHATE SERPL-MCNC: 4.1 MG/DL — SIGNIFICANT CHANGE UP (ref 2.5–4.5)
PLATELET # BLD AUTO: 264 K/UL — SIGNIFICANT CHANGE UP (ref 150–400)
POTASSIUM SERPL-MCNC: 3.4 MMOL/L — LOW (ref 3.5–5.3)
POTASSIUM SERPL-SCNC: 3.4 MMOL/L — LOW (ref 3.5–5.3)
RBC # BLD: 4.82 M/UL — SIGNIFICANT CHANGE UP (ref 4.2–5.8)
RBC # FLD: 13 % — SIGNIFICANT CHANGE UP (ref 10.3–14.5)
SODIUM SERPL-SCNC: 142 MMOL/L — SIGNIFICANT CHANGE UP (ref 135–145)
WBC # BLD: 8.57 K/UL — SIGNIFICANT CHANGE UP (ref 3.8–10.5)
WBC # FLD AUTO: 8.57 K/UL — SIGNIFICANT CHANGE UP (ref 3.8–10.5)

## 2021-02-10 PROCEDURE — 99232 SBSQ HOSP IP/OBS MODERATE 35: CPT

## 2021-02-10 PROCEDURE — 93306 TTE W/DOPPLER COMPLETE: CPT | Mod: 26

## 2021-02-10 RX ORDER — CLONAZEPAM 1 MG
0.5 TABLET ORAL
Refills: 0 | Status: DISCONTINUED | OUTPATIENT
Start: 2021-02-11 | End: 2021-02-11

## 2021-02-10 RX ORDER — PANTOPRAZOLE SODIUM 20 MG/1
40 TABLET, DELAYED RELEASE ORAL EVERY 12 HOURS
Refills: 0 | Status: DISCONTINUED | OUTPATIENT
Start: 2021-02-10 | End: 2021-02-11

## 2021-02-10 RX ORDER — POTASSIUM CHLORIDE 20 MEQ
40 PACKET (EA) ORAL ONCE
Refills: 0 | Status: COMPLETED | OUTPATIENT
Start: 2021-02-10 | End: 2021-02-10

## 2021-02-10 RX ORDER — CLONAZEPAM 1 MG
0.5 TABLET ORAL ONCE
Refills: 0 | Status: DISCONTINUED | OUTPATIENT
Start: 2021-02-10 | End: 2021-02-10

## 2021-02-10 RX ADMIN — Medication 0.5 MILLIGRAM(S): at 22:16

## 2021-02-10 RX ADMIN — HEPARIN SODIUM 5000 UNIT(S): 5000 INJECTION INTRAVENOUS; SUBCUTANEOUS at 06:00

## 2021-02-10 RX ADMIN — Medication 0.5 MILLIGRAM(S): at 06:00

## 2021-02-10 RX ADMIN — BUPRENORPHINE AND NALOXONE 1 TABLET(S): 2; .5 TABLET SUBLINGUAL at 10:46

## 2021-02-10 RX ADMIN — SODIUM CHLORIDE 100 MILLILITER(S): 9 INJECTION, SOLUTION INTRAVENOUS at 16:06

## 2021-02-10 RX ADMIN — PANTOPRAZOLE SODIUM 40 MILLIGRAM(S): 20 TABLET, DELAYED RELEASE ORAL at 05:59

## 2021-02-10 RX ADMIN — SODIUM CHLORIDE 100 MILLILITER(S): 9 INJECTION, SOLUTION INTRAVENOUS at 05:55

## 2021-02-10 RX ADMIN — Medication 0.5 MILLIGRAM(S): at 13:39

## 2021-02-10 RX ADMIN — Medication 40 MILLIEQUIVALENT(S): at 10:46

## 2021-02-10 RX ADMIN — HEPARIN SODIUM 5000 UNIT(S): 5000 INJECTION INTRAVENOUS; SUBCUTANEOUS at 13:40

## 2021-02-10 RX ADMIN — PANTOPRAZOLE SODIUM 40 MILLIGRAM(S): 20 TABLET, DELAYED RELEASE ORAL at 16:40

## 2021-02-10 RX ADMIN — BUPRENORPHINE AND NALOXONE 1 TABLET(S): 2; .5 TABLET SUBLINGUAL at 22:16

## 2021-02-10 RX ADMIN — HEPARIN SODIUM 5000 UNIT(S): 5000 INJECTION INTRAVENOUS; SUBCUTANEOUS at 22:16

## 2021-02-10 RX ADMIN — SODIUM CHLORIDE 100 MILLILITER(S): 9 INJECTION, SOLUTION INTRAVENOUS at 22:15

## 2021-02-10 NOTE — PROGRESS NOTE BEHAVIORAL HEALTH - NSBHFUPINTERVALHXFT_PSY_A_CORE
Patient says that he had been using heroin and Xanax, and thinks he may have taken Xanax 2mg twice in one day which he takes intermittently.  Pt states he feels better, less anxious, no tremors. Pt denies depression, psychosis,sukhi. pt slept better last night. no prns needed.

## 2021-02-10 NOTE — PROGRESS NOTE ADULT - PROBLEM SELECTOR PROBLEM 4
Polysubstance (including opioids) dependence with physiological dependence

## 2021-02-10 NOTE — PROGRESS NOTE ADULT - PROBLEM SELECTOR PLAN 4
hx of opioids / cocaine/ heroine / xanax etc   psych following   restarted on subexone / klonopin
hx of opioids / cocaine/ heroine / xanax etc   psych consult in am
hx of opioids / cocaine/ heroine / xanax etc   psych following   restarted on subexone / klonopin
hx of opioids / cocaine/ heroine / xanax etc   psych following   restarted on subexone / klonopin

## 2021-02-10 NOTE — PROGRESS NOTE ADULT - PROBLEM SELECTOR PLAN 1
etiology unclear  -? seizures   VEEG  -seen by neurology  MRI brain   -initially thought : opioid withdrawal   however spiked 101 F temp in ED   s/p LP  s/p ativan 2 mg x 1 dose   recommend starting klonopin 1 mg bid   seen by neuro : restart subexon and decrease klonopin to 0.5 mg bid from tomorrow
etiology unclear  -? seizures   VEEG  -seen by neurology  MRI brain   -initially thought : opioid withdrawal   however spiked 101 F temp in ED   s/p LP  s/p ativan 2 mg x 1 dose   recommend starting klonopin 1 mg bid from tomorrow
now resolved   likely from polysub abuse   -seen by psych   -started on suboxone bid , taper klonopin
now resolved   likely from polysub abuse   -seen by psych   -started on suboxone bid , taper klonopin

## 2021-02-10 NOTE — PROGRESS NOTE ADULT - PROBLEM SELECTOR PLAN 3
less likely   off all abx
less likely   off all abx
less likely   -d/c acylovir   -c/w rocephine and vanco till c/s comes back
less likely   off all abx

## 2021-02-10 NOTE — PROGRESS NOTE BEHAVIORAL HEALTH - NSBHCHARTREVIEWLAB_PSY_A_CORE FT
14.2   8.57  )-----------( 264      ( 10 Feb 2021 07:07 )             40.2     02-10    142  |  107  |  10  ----------------------------<  88  3.4<L>   |  22  |  0.78    Ca    9.3      10 Feb 2021 07:05  Phos  4.1     02-10  Mg     2.3     02-10

## 2021-02-10 NOTE — PROGRESS NOTE BEHAVIORAL HEALTH - NSBHCHARTREVIEWVS_PSY_A_CORE FT
ICU Vital Signs Last 24 Hrs  T(C): 36.8 (10 Feb 2021 11:36), Max: 37 (09 Feb 2021 21:30)  T(F): 98.3 (10 Feb 2021 11:36), Max: 98.6 (09 Feb 2021 21:30)  HR: 64 (10 Feb 2021 11:36) (64 - 74)  BP: 130/82 (10 Feb 2021 11:36) (115/76 - 130/82)  BP(mean): --  ABP: --  ABP(mean): --  RR: 18 (10 Feb 2021 11:36) (18 - 19)  SpO2: 98% (10 Feb 2021 11:36) (97% - 99%)

## 2021-02-10 NOTE — CHART NOTE - NSCHARTNOTEFT_GEN_A_CORE
Briefly, this is a 32 y/o man with hx of substance abuse, p/w AMS, lethargy and hallucinations, now resolved. States he uses heroin and THC as seen in his utox  On initial assessment there was concern for possible withdrawal seizure activity for which he was started on Klonopin 1mg BID.     2/8- EEG SUMMARY/CLASSIFICATION  Abnormal EEG in the awake state   - Mild generalized slowing.  _____________________________________________________________  EEG IMPRESSION/CLINICAL CORRELATE  Abnormal EEG study.  1. Mild nonspecific diffuse or multifocal cerebral dysfunction.   2. No epileptiform pattern or seizure seen.  * Limited evaluation.  Very prominent myogenic and movement artifacts were noted. No video recorded.     Pt with hx of heroin abuse p/w encephalopathy, now resolved.    Recommend:  [] Can obtain MRI brain to evaluate for heroin induced leukoencephalopathy although this likely will not .   [] Klonopin taper per Psychiatry  [] Outpatient follow up with Neurology upon discharge at [phone]    Please re-consult PRN

## 2021-02-10 NOTE — PROGRESS NOTE BEHAVIORAL HEALTH - NSBHCHARTREVIEWINVESTIGATE_PSY_A_CORE FT
e< from: 12 Lead ECG (02.06.21 @ 09:59) >    Ventricular Rate 67 BPM  Atrial Rate 67 BPM  P-R Interval 130 ms    QRS Duration 104 ms    Q-T Interval 426 ms    QTC Calculation(Bazett) 450 ms    P Axis 42 degrees    R Axis 57 degrees    T Axis 67 degrees    Diagnosis Line NORMAL SINUS RHYTHM WITH SINUS ARRHYTHMIA  NORMAL ECG  WHEN COMPARED WITH ECG OF 30-MAY-2010 21:57,  sinus rate has increased  Confirmed by MD MILLER RAMAN ([id number]) on 2/6/2021 3:15:10 PM    < end of copied text >    < from: 12 Lead ECG (02.06.21 @ 09:59) >      Ventricular Rate 67 BPM    Atrial Rate 67 BPM    P-R Interval 130 ms    QRS Duration 104 ms    Q-T Interval 426 ms    QTC Calculation(Bazett) 450 ms    P Axis 42 degrees    R Axis 57 degrees    T Axis 67 degrees    Diagnosis Line NORMAL SINUS RHYTHM WITH SINUS ARRHYTHMIA  NORMAL ECG  WHEN COMPARED WITH ECG OF 30-MAY-2010 21:57,  sinus rate has increased  Confirmed by MD MILLER RAMAN ([id number]) on 2/6/2021 3:15:10 PM    < end of copied text >

## 2021-02-10 NOTE — PROGRESS NOTE BEHAVIORAL HEALTH - SUMMARY
Pt is a 32 y/o man with hx of substance abuse including relapse on Heroin since one year ago when he stopped taking Suboxone and Xanax 2-4mg daily intermittently, p/w AMS, lethargy and hallucinations. Pt today seen by team, is awake and conversational. Pt admits to snorting heroin prior to coming to the hospital. This is his drug of choice, denies any other drug use, although urine tox was also positive for THC.   Pt complains mainly of recent difficulty with short term memory, and symptoms of WD such as pain and nausea. On initial assessment there was concern for possible withdrawal seizure activity for which he was started on Klonopin 1mg BID.   On exam he has myoclonus and whole body tremor, he is awake, fully oriented, speech normal fluent, follows commands, strength intact throughout, CN all intact, reflexes 2+ throughout.   - CT head findings of Mild volume loss, microvascular disease inconsistent with young age; could be secondary effects of polysubstance abuse.

## 2021-02-10 NOTE — CHART NOTE - NSCHARTNOTESELECT_GEN_ALL_CORE
RRT/Event Note
fever/Event Note
AMS/Event Note
ED Social Work Note
Event Note
Event Note
Neurology/Event Note

## 2021-02-10 NOTE — PROGRESS NOTE ADULT - PROBLEM SELECTOR PLAN 2
s/p LP  neg for any inf   seen by ID  -d/c acylovir   -c/w rocephine and vanco till all c/s comes back
s/p LP  neg for any inf   seen by ID  -off all abx

## 2021-02-10 NOTE — PROGRESS NOTE ADULT - ASSESSMENT
a/p  30yo M pmhx opioid use disorder p/w multiple complaints including intermittent SOB, nausea/vomiting and intermittent memory issues    1. Intermittent dyspnea  -no clinical chf, volume overload  -CXR with clear lungs  -EKG without ischemic changes  -tox screen noted   -await echo to rule out LV or valvular dysfunction - can be done oupt     2. AMS  -RRT/events noted  -etiology unclear  -CT head neg  -s/p LP   -lafebrile  -observe off abx per ID  -no arrhythmia to account for episodes   -EEG noted  -w/u per med    dvt ppx

## 2021-02-11 ENCOUNTER — TRANSCRIPTION ENCOUNTER (OUTPATIENT)
Age: 31
End: 2021-02-11

## 2021-02-11 VITALS
RESPIRATION RATE: 17 BRPM | TEMPERATURE: 98 F | OXYGEN SATURATION: 97 % | HEART RATE: 87 BPM | SYSTOLIC BLOOD PRESSURE: 122 MMHG | DIASTOLIC BLOOD PRESSURE: 81 MMHG

## 2021-02-11 LAB
ANION GAP SERPL CALC-SCNC: 16 MMOL/L — SIGNIFICANT CHANGE UP (ref 5–17)
B BURGDOR DNA SPEC QL NAA+PROBE: NEGATIVE — SIGNIFICANT CHANGE UP
BUN SERPL-MCNC: 11 MG/DL — SIGNIFICANT CHANGE UP (ref 7–23)
CALCIUM SERPL-MCNC: 9.9 MG/DL — SIGNIFICANT CHANGE UP (ref 8.4–10.5)
CHLORIDE SERPL-SCNC: 103 MMOL/L — SIGNIFICANT CHANGE UP (ref 96–108)
CO2 SERPL-SCNC: 21 MMOL/L — LOW (ref 22–31)
CREAT SERPL-MCNC: 0.82 MG/DL — SIGNIFICANT CHANGE UP (ref 0.5–1.3)
CULTURE RESULTS: SIGNIFICANT CHANGE UP
CULTURE RESULTS: SIGNIFICANT CHANGE UP
GLUCOSE SERPL-MCNC: 84 MG/DL — SIGNIFICANT CHANGE UP (ref 70–99)
HCT VFR BLD CALC: 42.9 % — SIGNIFICANT CHANGE UP (ref 39–50)
HGB BLD-MCNC: 15.1 G/DL — SIGNIFICANT CHANGE UP (ref 13–17)
MCHC RBC-ENTMCNC: 29.5 PG — SIGNIFICANT CHANGE UP (ref 27–34)
MCHC RBC-ENTMCNC: 35.2 GM/DL — SIGNIFICANT CHANGE UP (ref 32–36)
MCV RBC AUTO: 83.8 FL — SIGNIFICANT CHANGE UP (ref 80–100)
NRBC # BLD: 0 /100 WBCS — SIGNIFICANT CHANGE UP (ref 0–0)
PLATELET # BLD AUTO: 311 K/UL — SIGNIFICANT CHANGE UP (ref 150–400)
POTASSIUM SERPL-MCNC: 3.7 MMOL/L — SIGNIFICANT CHANGE UP (ref 3.5–5.3)
POTASSIUM SERPL-SCNC: 3.7 MMOL/L — SIGNIFICANT CHANGE UP (ref 3.5–5.3)
RBC # BLD: 5.12 M/UL — SIGNIFICANT CHANGE UP (ref 4.2–5.8)
RBC # FLD: 13.2 % — SIGNIFICANT CHANGE UP (ref 10.3–14.5)
SARS-COV-2 IGG SERPL QL IA: NEGATIVE — SIGNIFICANT CHANGE UP
SARS-COV-2 IGM SERPL IA-ACNC: <0.1 INDEX — SIGNIFICANT CHANGE UP
SODIUM SERPL-SCNC: 140 MMOL/L — SIGNIFICANT CHANGE UP (ref 135–145)
SPECIMEN SOURCE: SIGNIFICANT CHANGE UP
SPECIMEN SOURCE: SIGNIFICANT CHANGE UP
WBC # BLD: 8.82 K/UL — SIGNIFICANT CHANGE UP (ref 3.8–10.5)
WBC # FLD AUTO: 8.82 K/UL — SIGNIFICANT CHANGE UP (ref 3.8–10.5)

## 2021-02-11 PROCEDURE — 70450 CT HEAD/BRAIN W/O DYE: CPT

## 2021-02-11 PROCEDURE — 96374 THER/PROPH/DIAG INJ IV PUSH: CPT | Mod: XU

## 2021-02-11 PROCEDURE — 82435 ASSAY OF BLOOD CHLORIDE: CPT

## 2021-02-11 PROCEDURE — 96376 TX/PRO/DX INJ SAME DRUG ADON: CPT | Mod: XU

## 2021-02-11 PROCEDURE — 96375 TX/PRO/DX INJ NEW DRUG ADDON: CPT | Mod: XU

## 2021-02-11 PROCEDURE — 87086 URINE CULTURE/COLONY COUNT: CPT

## 2021-02-11 PROCEDURE — 99232 SBSQ HOSP IP/OBS MODERATE 35: CPT

## 2021-02-11 PROCEDURE — U0005: CPT

## 2021-02-11 PROCEDURE — 85018 HEMOGLOBIN: CPT

## 2021-02-11 PROCEDURE — 82803 BLOOD GASES ANY COMBINATION: CPT

## 2021-02-11 PROCEDURE — 85014 HEMATOCRIT: CPT

## 2021-02-11 PROCEDURE — 71045 X-RAY EXAM CHEST 1 VIEW: CPT

## 2021-02-11 PROCEDURE — 82607 VITAMIN B-12: CPT

## 2021-02-11 PROCEDURE — 85027 COMPLETE CBC AUTOMATED: CPT

## 2021-02-11 PROCEDURE — 95714 VEEG EA 12-26 HR UNMNTR: CPT

## 2021-02-11 PROCEDURE — 80202 ASSAY OF VANCOMYCIN: CPT

## 2021-02-11 PROCEDURE — 82962 GLUCOSE BLOOD TEST: CPT

## 2021-02-11 PROCEDURE — 80307 DRUG TEST PRSMV CHEM ANLYZR: CPT

## 2021-02-11 PROCEDURE — 87476 LYME DIS DNA AMP PROBE: CPT

## 2021-02-11 PROCEDURE — 96361 HYDRATE IV INFUSION ADD-ON: CPT | Mod: XU

## 2021-02-11 PROCEDURE — 87040 BLOOD CULTURE FOR BACTERIA: CPT

## 2021-02-11 PROCEDURE — 83605 ASSAY OF LACTIC ACID: CPT

## 2021-02-11 PROCEDURE — 87529 HSV DNA AMP PROBE: CPT

## 2021-02-11 PROCEDURE — 87483 CNS DNA AMP PROBE TYPE 12-25: CPT

## 2021-02-11 PROCEDURE — 87070 CULTURE OTHR SPECIMN AEROBIC: CPT

## 2021-02-11 PROCEDURE — 82330 ASSAY OF CALCIUM: CPT

## 2021-02-11 PROCEDURE — 84100 ASSAY OF PHOSPHORUS: CPT

## 2021-02-11 PROCEDURE — 80048 BASIC METABOLIC PNL TOTAL CA: CPT

## 2021-02-11 PROCEDURE — 89051 BODY FLUID CELL COUNT: CPT

## 2021-02-11 PROCEDURE — 83735 ASSAY OF MAGNESIUM: CPT

## 2021-02-11 PROCEDURE — 80053 COMPREHEN METABOLIC PANEL: CPT

## 2021-02-11 PROCEDURE — 87205 SMEAR GRAM STAIN: CPT

## 2021-02-11 PROCEDURE — U0003: CPT

## 2021-02-11 PROCEDURE — 93005 ELECTROCARDIOGRAM TRACING: CPT | Mod: XU

## 2021-02-11 PROCEDURE — 84443 ASSAY THYROID STIM HORMONE: CPT

## 2021-02-11 PROCEDURE — 84295 ASSAY OF SERUM SODIUM: CPT

## 2021-02-11 PROCEDURE — 82945 GLUCOSE OTHER FLUID: CPT

## 2021-02-11 PROCEDURE — 83615 LACTATE (LD) (LDH) ENZYME: CPT

## 2021-02-11 PROCEDURE — 82746 ASSAY OF FOLIC ACID SERUM: CPT

## 2021-02-11 PROCEDURE — 85025 COMPLETE CBC W/AUTO DIFF WBC: CPT

## 2021-02-11 PROCEDURE — 99285 EMERGENCY DEPT VISIT HI MDM: CPT | Mod: 25

## 2021-02-11 PROCEDURE — 86769 SARS-COV-2 COVID-19 ANTIBODY: CPT

## 2021-02-11 PROCEDURE — 80076 HEPATIC FUNCTION PANEL: CPT

## 2021-02-11 PROCEDURE — 82947 ASSAY GLUCOSE BLOOD QUANT: CPT

## 2021-02-11 PROCEDURE — 51702 INSERT TEMP BLADDER CATH: CPT

## 2021-02-11 PROCEDURE — 84157 ASSAY OF PROTEIN OTHER: CPT

## 2021-02-11 PROCEDURE — 93306 TTE W/DOPPLER COMPLETE: CPT

## 2021-02-11 PROCEDURE — 84132 ASSAY OF SERUM POTASSIUM: CPT

## 2021-02-11 PROCEDURE — 97162 PT EVAL MOD COMPLEX 30 MIN: CPT

## 2021-02-11 PROCEDURE — 95700 EEG CONT REC W/VID EEG TECH: CPT

## 2021-02-11 PROCEDURE — 62270 DX LMBR SPI PNXR: CPT

## 2021-02-11 PROCEDURE — 86255 FLUORESCENT ANTIBODY SCREEN: CPT

## 2021-02-11 RX ORDER — PANTOPRAZOLE SODIUM 20 MG/1
1 TABLET, DELAYED RELEASE ORAL
Qty: 60 | Refills: 0
Start: 2021-02-11 | End: 2021-03-12

## 2021-02-11 RX ORDER — BUPRENORPHINE AND NALOXONE 2; .5 MG/1; MG/1
1 TABLET SUBLINGUAL
Qty: 60 | Refills: 0
Start: 2021-02-11 | End: 2021-03-12

## 2021-02-11 RX ORDER — CLONAZEPAM 1 MG
1 TABLET ORAL
Qty: 28 | Refills: 0
Start: 2021-02-11 | End: 2021-02-24

## 2021-02-11 RX ORDER — CLONAZEPAM 1 MG
0.5 TABLET ORAL
Refills: 0 | Status: DISCONTINUED | OUTPATIENT
Start: 2021-02-11 | End: 2021-02-11

## 2021-02-11 RX ORDER — GABAPENTIN 400 MG/1
1 CAPSULE ORAL
Qty: 60 | Refills: 0
Start: 2021-02-11 | End: 2021-03-12

## 2021-02-11 RX ADMIN — SODIUM CHLORIDE 100 MILLILITER(S): 9 INJECTION, SOLUTION INTRAVENOUS at 06:04

## 2021-02-11 RX ADMIN — PANTOPRAZOLE SODIUM 40 MILLIGRAM(S): 20 TABLET, DELAYED RELEASE ORAL at 05:37

## 2021-02-11 RX ADMIN — Medication 0.5 MILLIGRAM(S): at 16:48

## 2021-02-11 RX ADMIN — Medication 0.5 MILLIGRAM(S): at 05:37

## 2021-02-11 RX ADMIN — HEPARIN SODIUM 5000 UNIT(S): 5000 INJECTION INTRAVENOUS; SUBCUTANEOUS at 13:25

## 2021-02-11 RX ADMIN — BUPRENORPHINE AND NALOXONE 1 TABLET(S): 2; .5 TABLET SUBLINGUAL at 10:31

## 2021-02-11 RX ADMIN — HEPARIN SODIUM 5000 UNIT(S): 5000 INJECTION INTRAVENOUS; SUBCUTANEOUS at 05:37

## 2021-02-11 RX ADMIN — PANTOPRAZOLE SODIUM 40 MILLIGRAM(S): 20 TABLET, DELAYED RELEASE ORAL at 16:48

## 2021-02-11 NOTE — PROGRESS NOTE BEHAVIORAL HEALTH - NSBHCHARTREVIEWVS_PSY_A_CORE FT
Vital Signs Last 24 Hrs  T(C): 36.7 (11 Feb 2021 11:30), Max: 37.3 (10 Feb 2021 20:19)  T(F): 98 (11 Feb 2021 11:30), Max: 99.1 (10 Feb 2021 20:19)  HR: 87 (11 Feb 2021 11:30) (76 - 87)  BP: 122/81 (11 Feb 2021 11:30) (112/74 - 130/81)  BP(mean): --  RR: 17 (11 Feb 2021 11:30) (17 - 19)  SpO2: 97% (11 Feb 2021 11:30) (97% - 98%)

## 2021-02-11 NOTE — PROGRESS NOTE ADULT - PROVIDER SPECIALTY LIST ADULT
Cardiology
Cardiology
Infectious Disease
Infectious Disease
Cardiology
Cardiology
Internal Medicine

## 2021-02-11 NOTE — PROGRESS NOTE BEHAVIORAL HEALTH - RISK ASSESSMENT
pt ovearll low risk for suicide attempt, no si/hi, no hx attempts, future oriented, risk factors include substance abuse
PT is not an acute risk but remains a chronic risk because of his substance abuse.
pt overall low risk for suicide attempt, no si/hi, no hx attempts, future oriented, risk factors include substance abuse
PT is not an acute risk but remains a chronic risk because of his substance abuse.

## 2021-02-11 NOTE — PROGRESS NOTE BEHAVIORAL HEALTH - NSBHCONSULTMEDANXIETY_PSY_A_CORE FT
Ativan 1mg po/IV q 4hrs prn agitation or anxiety

## 2021-02-11 NOTE — PHYSICAL THERAPY INITIAL EVALUATION ADULT - ASR WT BEARING STATUS EVAL
CTH :Mild volume loss, microvascular disease, no acute hemorrhage or midline shift. If symptoms persist consider follow-up head CT or MR if no contraindications.

## 2021-02-11 NOTE — PROGRESS NOTE BEHAVIORAL HEALTH - NSBHCHARTREVIEWINVESTIGATE_PSY_A_CORE FT
e< from: 12 Lead ECG (02.06.21 @ 09:59) >    Ventricular Rate 67 BPM  Atrial Rate 67 BPM  P-R Interval 130 ms    QRS Duration 104 ms    Q-T Interval 426 ms    QTC Calculation(Bazett) 450 ms    P Axis 42 degrees    R Axis 57 degrees    T Axis 67 degrees    Diagnosis Line NORMAL SINUS RHYTHM WITH SINUS ARRHYTHMIA  NORMAL ECG  WHEN COMPARED WITH ECG OF 30-MAY-2010 21:57,  sinus rate has increased  Confirmed by MD MILLER RAMAN ([id number]) on 2/6/2021 3:15:10 PM    < end of copied text >    < from: 12 Lead ECG (02.06.21 @ 09:59) >      Ventricular Rate 67 BPM    Atrial Rate 67 BPM    P-R Interval 130 ms    QRS Duration 104 ms    Q-T Interval 426 ms    QTC Calculation(Bazett) 450 ms    P Axis 42 degrees    R Axis 57 degrees    T Axis 67 degrees    Diagnosis Line NORMAL SINUS RHYTHM WITH SINUS ARRHYTHMIA  NORMAL ECG  WHEN COMPARED WITH ECG OF 30-MAY-2010 21:57,  sinus rate has increased  Confirmed by MD MILLER RAMAN ([id number]) on 2/6/2021 3:15:10 PM    < end of copied text >

## 2021-02-11 NOTE — PROGRESS NOTE BEHAVIORAL HEALTH - PRIMARY DX
Delirium
Delirium
Polysubstance (including opioids) dependence with physiological dependence
Polysubstance (including opioids) dependence with physiological dependence

## 2021-02-11 NOTE — DISCHARGE NOTE NURSING/CASE MANAGEMENT/SOCIAL WORK - NSSBIRTALCADDRESSTXFAC_GEN_A_CORE
[address] [id number]  Complete the hepatitis b vaccine series and get blood test 6 months after completing the series

## 2021-02-11 NOTE — DISCHARGE NOTE PROVIDER - NSDCMRMEDTOKEN_GEN_ALL_CORE_FT
Neurontin 300 mg oral capsule: 1 cap(s) orally 2 times a day   pantoprazole 40 mg oral delayed release tablet: 1 tab(s) orally every 12 hours   buprenorphine-naloxone 8 mg-2 mg sublingual tablet: 1 tab(s) sublingual 2 times a day x 30 days MDD:2 tabs opiate dependence  clonazePAM 0.5 mg oral tablet: 1 tab(s) orally 2 times a day x 14 days MDD:1mg  anxiety disorder  Neurontin 300 mg oral capsule: 1 cap(s) orally 2 times a day   pantoprazole 40 mg oral delayed release tablet: 1 tab(s) orally every 12 hours

## 2021-02-11 NOTE — PROGRESS NOTE ADULT - REASON FOR ADMISSION
lethargic / vomiting / fever 101 F

## 2021-02-11 NOTE — PHYSICAL THERAPY INITIAL EVALUATION ADULT - PERTINENT HX OF CURRENT PROBLEM, REHAB EVAL
32 y/o M with PMHx of Polysubstance abuse p/w short term memory loss. Pt states that he snorts heroin. Now very anxious, believes he had COVID, was tested negative x2.

## 2021-02-11 NOTE — PROGRESS NOTE BEHAVIORAL HEALTH - AXIS III
recent AMS unclear etiology

## 2021-02-11 NOTE — DISCHARGE NOTE PROVIDER - HOSPITAL COURSE
30yo M pmhx opioid use disorder p/w multiple complaints including intermittent SOB, nausea/vomiting and intermittent memory issues, pt. reports last opioid use was "a couple of days ago" per primary team, patient concerned for infection with COVID-19 but recently tested negative. pt. was thought for opioid withdrawal s/p 2mg buprenorphine with little improvement, however after escalating treatment with suboxone patient remains symptomatic and intermittently confused. Now with rectal temp to 101.    d/w mother and sister over phone to obtain history: extensive Hx of polysubstance abuse, marijuana, cocaine, heroine, he was on suboxone for 2 years and in Dec 2019 has heroine overdose.   family thinks that he still use opioids/ ativan and marijuana, however they didn't find any in his room. he had back surgeries x 2 in past.     Problem/Plan - 1:  ·  Problem: Altered mental status.  Plan: now resolved   likely from polysub abuse   -seen by psych   -started on suboxone bid, klonopin taper.     Problem/Plan - 2:  ·  Problem: Acute encephalopathy.  Plan: s/p LP neg for inf   seen by ID  -off all abx.      Problem/Plan - 3:  ·  Problem: Meningitis.  Plan: less likely   off all abx.      Problem/Plan - 4:  ·  Problem: Polysubstance (including opioids) dependence with physiological dependence.  Plan: hx of opioids / cocaine/ heroine / xanax etc   psych following   restarted on suboxone / klonopin.     Patient now stable for discharge. Prescriptions for suboxone and klonipin sent to pharmacy by psych. Patient to follow up at [facility] in [place] ([phone]) for counseling and treatment. Follow up appointments are scheduled for Monday 1/15 at 3:20PM and Wednesday 1/17 at 1PM.   30yo M pmhx opioid use disorder p/w multiple complaints including intermittent SOB, nausea/vomiting and intermittent memory issues, pt. reports last opioid use was "a couple of days ago" per primary team, patient concerned for infection with COVID-19 but recently tested negative. pt. was thought for opioid withdrawal s/p 2mg buprenorphine with little improvement, however after escalating treatment with suboxone patient remains symptomatic and intermittently confused. Now with rectal temp to 101.    Discussed mother and sister over phone to obtain history: extensive Hx of polysubstance abuse, marijuana, cocaine, heroine, he was on suboxone for 2 years and in Dec 2019 has heroine overdose.   family thinks that he still use opioids/ ativan and marijuana, however they didn't find any in his room. he had back surgeries x 2 in past.     Problem/Plan - 1:  ·  Problem: Altered mental status.  Plan: now resolved   likely from polysub abuse   -seen by psych   -started on suboxone bid, klonopin taper.     Problem/Plan - 2:  ·  Problem: Acute encephalopathy.  Plan: s/p LP neg for inf   seen by ID  -off all abx.      Problem/Plan - 3:  ·  Problem: Meningitis.  Plan: less likely   off all abx.      Problem/Plan - 4:  ·  Problem: Polysubstance (including opioids) dependence with physiological dependence.  Plan: hx of opioids / cocaine/ heroine / xanax etc   psych following   restarted on suboxone / klonopin.     Patient now stable for discharge. Prescriptions for suboxone and klonipin sent to pharmacy by psych. Patient to follow up at [facility] in [place] ([phone]) for counseling and treatment. Follow up appointments are scheduled for Monday 1/15 at 3:20PM and Wednesday 1/17 at 1PM.

## 2021-02-11 NOTE — PROGRESS NOTE BEHAVIORAL HEALTH - SUMMARY
Pt is a 32 y/o man with hx of substance abuse including relapse on Heroin since one year ago when he stopped taking Suboxone and Xanax 2-4mg daily intermittently, p/w AMS, lethargy and hallucinations. Pt today seen by team, is awake and conversational. Pt admits to snorting heroin prior to coming to the hospital. This is his drug of choice, denies any other drug use, although urine tox was also positive for THC.   Pt complains mainly of recent difficulty with short term memory, and symptoms of WD such as pain and nausea. On initial assessment there was concern for possible withdrawal seizure activity for which he was started on Klonopin 1mg BID. now tapered to 0.5mg BID    - CT head findings of Mild volume loss, microvascular disease inconsistent with young age; could be secondary effects of polysubstance abuse.  Plan is for pt to go to intensive outpt treatment at [facility]

## 2021-02-11 NOTE — PROGRESS NOTE BEHAVIORAL HEALTH - NSBHCONSULTSUBSTANCEALCOHOL_PSY_A_CORE FT
as above Clonazepam taper

## 2021-02-11 NOTE — PROGRESS NOTE ADULT - ATTENDING COMMENTS
c/w psych f/u . d/c planning once psych clears
clinically better , cannot sign AMA. will need psych clearance for d/c home : likely in am
Patient seen and examined.  Agree with above NP note.  cv stable   neuro w/u in progress  med/neuro f/u   echo with normal lv fxn  no tele events  dvt ppx
Pt seen and examined, agree with the above assessment and plan by [facility].  CV stable  No CHF  ECHO can be done as outpt  DCP per psych followup
clinically nadya , cannot sign AMA. will need psych clearance for d/c home
extensive psych history regarding polysubstance abuse. seen by psych

## 2021-02-11 NOTE — PROGRESS NOTE ADULT - ASSESSMENT
a/p  30yo M pmhx opioid use disorder p/w multiple complaints including intermittent SOB, nausea/vomiting and intermittent memory issues    1. Intermittent dyspnea  -no clinical chf, volume overload  -CXR with clear lungs  -EKG without ischemic changes  -tox screen noted   -echo with nl lV sys fx      2. AMS, resolved   -s/p RRT 2/8  -etiology unclear  -CT head neg  -s/p LP neg for infection  -afebrile  -observe off abx per ID  -no arrhythmia to account for episodes   -EEG noted  -w/u per med    dvt ppx      a/p  30 y/o M pmhx opioid use disorder p/w multiple complaints including intermittent SOB, nausea/vomiting and intermittent memory issues    1. Intermittent dyspnea  -no clinical chf, volume overload  -CXR with clear lungs  -EKG without ischemic changes  -tox screen noted   -echo with nl lV sys fx      2. AMS, resolved   -s/p RRT 2/8  -etiology unclear  -CT head neg  -s/p LP neg for infection  -afebrile  -observe off abx per ID  -no arrhythmia to account for episodes   -EEG noted  -w/u per med    dvt ppx

## 2021-02-11 NOTE — PROGRESS NOTE BEHAVIORAL HEALTH - NSBHFUPINTERVALHXFT_PSY_A_CORE
Pt is feeling better and is highly motivated to continue treatment with Suboxone.  He will need a prescription until he is able to get another prescription from his program. He is scheduled for an intake but does not yet have an appointment with an MD.  Patient says that he had been using heroin and Xanax, and thinks he may have taken Xanax 2mg twice in one day which he takes intermittently. He was very worried that he had a period of black out for several days following the episode of confusion at home.  Pt states he feels better, less anxious, no tremors. Pt denies depression, psychosis, sukhi. pt slept better last night. no prns needed. His memory and cognition is now at his baseline and he is scheduled to start the [facility] program in [facility].  His sister, Deborah [phone] is aware of which medications he will have at discharge and will call if any need for further assistance.

## 2021-02-11 NOTE — PROGRESS NOTE BEHAVIORAL HEALTH - NSBHCONSFOLLOWNEEDS_PSY_A_CORE
Patient needs further psychiatric safety assessment prior to discharge
no psychiatric contraindications to discharge
Patient needs further psychiatric safety assessment prior to discharge
no psychiatric contraindications to discharge

## 2021-02-11 NOTE — DISCHARGE NOTE NURSING/CASE MANAGEMENT/SOCIAL WORK - PATIENT PORTAL LINK FT
You can access the FollowMyHealth Patient Portal offered by [facility] by registering at the following website: [email or web address]. By joining [facility]’s FollowMyHealth portal, you will also be able to view your health information using other applications (apps) compatible with our system.

## 2021-02-11 NOTE — DISCHARGE NOTE PROVIDER - NSDCCPCAREPLAN_GEN_ALL_CORE_FT
PRINCIPAL DISCHARGE DIAGNOSIS  Diagnosis: Acute encephalopathy  Assessment and Plan of Treatment: Likely from polysubstance abuse  Symptoms now resolved   Seen and evaluate by neurology, psychiatry, infectious disease.   Lumbar puncture negative for infection.   Monitored off antiobiotics.      SECONDARY DISCHARGE DIAGNOSES  Diagnosis: Polysubstance abuse  Assessment and Plan of Treatment: Continue on Suboxone and Klonipin taper as prescribed.   Continue on Neurontin.   Follow up with [facility] in [place] ([phone]) for counseling and treatment. Your follow up appointments are scheduled for Monday 1/15 at 3:20PM and Wednesday 1/17 at 1PM.

## 2021-02-11 NOTE — PROGRESS NOTE BEHAVIORAL HEALTH - NSBHCONSULTFOLLOWAFTERCARE_PSY_A_CORE FT
F/u at the [facility] [phone]
pt can f/u at [facility] daers program, sw referral for list of rehabs

## 2021-02-11 NOTE — PROGRESS NOTE BEHAVIORAL HEALTH - NSBHFUPINTERVALCCFT_PSY_A_CORE
"I can now remember the date."
"I am much better, but I will need Suboxone."
"I can't remember anything."
I am much better

## 2021-02-11 NOTE — PHYSICAL THERAPY INITIAL EVALUATION ADULT - DISCHARGE DISPOSITION, PT EVAL
Strep throat   ---  rapid strep test positive:   Finish all antibiotics   Wipe down all surfaces, handles, tools, electronics in home and car  with soapy bleach water daily X 1 week.   Chloraseptic spray and/or ibuprofen  for pain.              Buttered noodles, grape/ apple juice, eggs, tuna   24 hour isolation-  ALL spit is contaminated...             Change toothbrush as improved  
home/no skilled PT needs

## 2021-02-11 NOTE — PROGRESS NOTE BEHAVIORAL HEALTH - NSBHCONSULTMEDS_PSY_A_CORE FT
Decrease to   Clonazepam 0.5mg po TID  Start Suboxone 8mg SL BID
Clonazepam 0.5mg BID  Suboxone 8/2mg SL bid  Neurontin 300mg po BID
Decrease to   Clonazepam 0.5mg po BID  Start Suboxone 8mg SL daily
cont klonopin, suboxone

## 2021-02-11 NOTE — DISCHARGE NOTE PROVIDER - CARE PROVIDER_API CALL
Amico, Frank J  INTERNAL MEDICINE  [address], [address]  [place] [address]  Phone: [phone]  Fax: [phone]  Established Patient  Follow Up Time: Routine

## 2021-02-11 NOTE — PHYSICAL THERAPY INITIAL EVALUATION ADULT - PRECAUTIONS/LIMITATIONS, REHAB EVAL
Dx: AMS unclear etiology ?opioid withdrawal - started on suboxone 2/8, on CIWA encephalopathy/fever s/p LP 2/9 s/p Acyclovir, CTX, Concern for NMS s/p Ativan 2/7 with good response

## 2021-02-11 NOTE — PROGRESS NOTE ADULT - SUBJECTIVE AND OBJECTIVE BOX
CARDIOLOGY FOLLOW UP - Dr. Nolen    CC no cp or sob        PHYSICAL EXAM:  T(C): 36.7 (02-11-21 @ 11:30), Max: 37.3 (02-10-21 @ 20:19)  HR: 87 (02-11-21 @ 11:30) (76 - 87)  BP: 122/81 (02-11-21 @ 11:30) (112/74 - 130/81)  RR: 17 (02-11-21 @ 11:30) (17 - 19)  SpO2: 97% (02-11-21 @ 11:30) (97% - 98%)  Wt(kg): --  I&O's Summary    10 Feb 2021 07:01  -  11 Feb 2021 07:00  --------------------------------------------------------  IN: 1840 mL / OUT: 2050 mL / NET: -210 mL    11 Feb 2021 07:01  -  11 Feb 2021 12:50  --------------------------------------------------------  IN: 360 mL / OUT: 600 mL / NET: -240 mL        Appearance: Normal	  Cardiovascular: Normal S1 S2,RRR, No JVD, No murmurs  Respiratory: Lungs clear to auscultation	  Gastrointestinal:  Soft, Non-tender, + BS	  Extremities: Normal range of motion, No clubbing, cyanosis or edema      Home Medications:      MEDICATIONS  (STANDING):  buprenorphine 8 mG/naloxone 2 mG SL  Tablet 1 Tablet(s) SubLingual <User Schedule>  clonazePAM  Tablet 0.5 milliGRAM(s) Oral two times a day  heparin   Injectable 5000 Unit(s) SubCutaneous every 8 hours  pantoprazole    Tablet 40 milliGRAM(s) Oral every 12 hours      TELEMETRY: off tele 	    ECG:  	  RADIOLOGY:   DIAGNOSTIC TESTING:  [ ] Echocardiogram:< from: Transthoracic Echocardiogram (02.10.21 @ 17:28) >  EF (Visual Estimate): 65-70 %  ------------------------------------------------------------------------  Observations:  Mitral Valve: Normal mitral valve. Minimal mitral  regurgitation.  Aortic Valve/Aorta: Normal trileaflet aortic valve. No  aortic valve regurgitation seen.  Aortic Root: 2.6 cm.  Left Atrium: Normal left atrium.  LA volume index = 16  cc/m2.  Left Ventricle: Endocardium not well visualized; grossly  normal left ventricular systolic function. No segmental  wall motion abnormalities. Normal left ventricular internal  dimensions and wall thicknesses. Normal diastolic function.  Right Heart: Normal right atrium. The right ventricle is  not well visualized on axis; grossly normal right  ventricular systolic function. Normal tricuspid valve.  Minimal tricuspid regurgitation. Pulmonic valve not well  visualized, probably normal. No pulmonic regurgitation.  Pericardium/Pleura: Normal pericardium with no pericardial  effusion.  Hemodynamic: Inadequate tricuspid regurgitation Doppler  envelope precludes estimation of RVSP.  ------------------------------------------------------------------------  Conclusions:  1. Normal mitral valve. Minimal mitral regurgitation.  2. Normal trileaflet aortic valve. No aortic valve  regurgitation seen.  3. Endocardium not well visualized; grossly normal left  ventricular systolic function. No segmental wall motion  abnormalities.  4. Normal diastolic function.  5. The right ventricle is not well visualized on axis;  grossly normal right ventricular systolic function.  *** No previous Echo exam.  ------------------------------------------------------------------------  Confirmed on  2/10/2021 - 19:33:19 by Ajith Woods M.D.  ------------------------------------------------------------------------    < end of copied text >    [ ]  Catheterization:  [ ] Stress Test:    OTHER: 	    LABS:	 	                            15.1   8.82  )-----------( 311      ( 11 Feb 2021 07:00 )             42.9     02-11    140  |  103  |  11  ----------------------------<  84  3.7   |  21<L>  |  0.82    Ca    9.9      11 Feb 2021 06:57  Phos  4.1     02-10  Mg     2.3     02-10              
CARDIOLOGY FOLLOW UP NOTE - DR. CH    Subjective:  events noted  RRT called, AMS  neuro w/u in progress    resting in bed, denies chest pain, dyspnea, palpitations, dizziness  ROS: otherwise negative   overnight events:      PHYSICAL EXAM:  T(C): 36.4 (02-08-21 @ 11:02), Max: 38.4 (02-07-21 @ 22:31)  HR: 56 (02-08-21 @ 11:02) (56 - 85)  BP: 134/86 (02-08-21 @ 11:02) (121/60 - 166/97)  RR: 18 (02-08-21 @ 11:02) (16 - 20)  SpO2: 97% (02-08-21 @ 11:02) (93% - 98%)  Wt(kg): --  I&O's Summary    07 Feb 2021 07:01  -  08 Feb 2021 07:00  --------------------------------------------------------  IN: 1943 mL / OUT: 2050 mL / NET: -107 mL    08 Feb 2021 07:01  -  08 Feb 2021 12:42  --------------------------------------------------------  IN: 240 mL / OUT: 0 mL / NET: 240 mL      Daily     Daily     Appearance: Normal	  Cardiovascular: Normal S1 S2,RRR, No JVD, No murmurs  Respiratory: Lungs clear to auscultation	  Gastrointestinal:  Soft, Non-tender, + BS	  Extremities: Normal range of motion, No clubbing, cyanosis or edema      Home Medications:      MEDICATIONS  (STANDING):  clonazePAM  Tablet 1 milliGRAM(s) Oral two times a day  dextrose 5% + sodium chloride 0.9%. 1000 milliLiter(s) (100 mL/Hr) IV Continuous <Continuous>  heparin   Injectable 5000 Unit(s) SubCutaneous every 8 hours  pantoprazole  Injectable 40 milliGRAM(s) IV Push every 12 hours      TELEMETRY: 	    ECG:  	  RADIOLOGY:   DIAGNOSTIC TESTING:  [ ] Echocardiogram:  [ ] Catheterization:  [ ] Stress Test:    OTHER: 	    LABS:	 	    CARDIAC MARKERS:                                13.6   7.40  )-----------( 246      ( 08 Feb 2021 05:40 )             38.6     02-08    141  |  105  |  9   ----------------------------<  107<H>  3.2<L>   |  24  |  0.80    Ca    8.7      08 Feb 2021 05:38  Phos  3.6     02-08  Mg     2.2     02-08    TPro  6.8  /  Alb  4.0  /  TBili  0.5  /  DBili  x   /  AST  24  /  ALT  41  /  AlkPhos  49  02-08    proBNP:     Lipid Profile:   HgA1c:     Creatinine, Serum: 0.80 mg/dL (02-08-21 @ 05:38)  Creatinine, Serum: 0.77 mg/dL (02-08-21 @ 01:35)  Creatinine, Serum: 0.83 mg/dL (02-07-21 @ 18:36)  Creatinine, Serum: 0.83 mg/dL (02-07-21 @ 11:41)  Creatinine, Serum: 0.87 mg/dL (02-06-21 @ 07:32)            
CARDIOLOGY FOLLOW UP - Dr. Nolen    CC: denies cp, sob, and palpitations       PHYSICAL EXAM:  T(C): 36.8 (02-10-21 @ 11:36), Max: 37 (02-09-21 @ 21:30)  HR: 64 (02-10-21 @ 11:36) (64 - 74)  BP: 130/82 (02-10-21 @ 11:36) (115/76 - 130/82)  RR: 18 (02-10-21 @ 11:36) (18 - 19)  SpO2: 98% (02-10-21 @ 11:36) (97% - 99%)  Wt(kg): --  I&O's Summary    09 Feb 2021 07:01  -  10 Feb 2021 07:00  --------------------------------------------------------  IN: 2880 mL / OUT: 2400 mL / NET: 480 mL    10 Feb 2021 07:01  -  10 Feb 2021 13:37  --------------------------------------------------------  IN: 500 mL / OUT: 1400 mL / NET: -900 mL        Appearance: Normal	  Cardiovascular: Normal S1 S2,RRR, No JVD, No murmurs  Respiratory: Lungs clear to auscultation	  Gastrointestinal:  Soft, Non-tender, + BS	  Extremities: Normal range of motion, No clubbing, cyanosis or edema      Home Medications:      MEDICATIONS  (STANDING):  buprenorphine 8 mG/naloxone 2 mG SL  Tablet 1 Tablet(s) SubLingual <User Schedule>  clonazePAM  Tablet 0.5 milliGRAM(s) Oral three times a day  dextrose 5% + sodium chloride 0.9%. 1000 milliLiter(s) (100 mL/Hr) IV Continuous <Continuous>  heparin   Injectable 5000 Unit(s) SubCutaneous every 8 hours  pantoprazole  Injectable 40 milliGRAM(s) IV Push every 12 hours      TELEMETRY: sb/sr 	    ECG:  	  RADIOLOGY:   DIAGNOSTIC TESTING:  [ ] Echocardiogram:  [ ]  Catheterization:  [ ] Stress Test:    OTHER: 	    LABS:	 	                            14.2   8.57  )-----------( 264      ( 10 Feb 2021 07:07 )             40.2     02-10    142  |  107  |  10  ----------------------------<  88  3.4<L>   |  22  |  0.78    Ca    9.3      10 Feb 2021 07:05  Phos  4.1     02-10  Mg     2.3     02-10              
CC: pt awake, alert, no complaints    TELEMETRY:     PHYSICAL EXAM:    T(C): 37 (02-09-21 @ 11:36), Max: 37.1 (02-08-21 @ 21:40)  HR: 106 (02-09-21 @ 11:36) (54 - 106)  BP: 147/81 (02-09-21 @ 11:36) (124/78 - 147/81)  RR: 18 (02-09-21 @ 11:36) (18 - 18)  SpO2: 96% (02-09-21 @ 11:36) (95% - 98%)  Wt(kg): --  I&O's Summary    08 Feb 2021 07:01  -  09 Feb 2021 07:00  --------------------------------------------------------  IN: 1540 mL / OUT: 3400 mL / NET: -1860 mL    09 Feb 2021 07:01  -  09 Feb 2021 12:45  --------------------------------------------------------  IN: 840 mL / OUT: 0 mL / NET: 840 mL        Appearance: Normal	  Cardiovascular: Normal S1 S2,RRR, No JVD, No murmurs  Respiratory: Lungs clear to auscultation	  Gastrointestinal:  Soft, Non-tender, + BS	  Extremities: Normal range of motion, No clubbing, cyanosis or edema  Vascular: Peripheral pulses palpable 2+ bilaterally     LABS:	 	                          14.4   8.01  )-----------( 282      ( 09 Feb 2021 06:18 )             40.6     02-09    142  |  106  |  8   ----------------------------<  94  3.0<L>   |  25  |  0.85    Ca    9.3      09 Feb 2021 06:18  Phos  3.3     02-09  Mg     2.4     02-09    TPro  6.8  /  Alb  4.0  /  TBili  0.5  /  DBili  x   /  AST  24  /  ALT  41  /  AlkPhos  49  02-08          CARDIAC MARKERS:        
Patient is a 31y old  Male who presents with a chief complaint of lethargic / vomiting / fever 101 F (08 Feb 2021 12:42)    Being followed by ID for fever    Interval history:afebrile  awake   answers queries appropriately  s/p RRT last night   No acute events      ROS:  No cough,SOB,CP  No N/V/D./abd pain  No other complaints      Antimicrobials:  abx Dced today     Other medications reviewed    Vital Signs Last 24 Hrs  T(C): 36.4 (02-08-21 @ 11:02), Max: 38.4 (02-07-21 @ 22:31)  T(F): 97.6 (02-08-21 @ 11:02), Max: 101.1 (02-07-21 @ 22:31)  HR: 56 (02-08-21 @ 11:02) (56 - 85)  BP: 134/86 (02-08-21 @ 11:02) (121/60 - 166/97)  BP(mean): --  RR: 18 (02-08-21 @ 11:02) (18 - 20)  SpO2: 97% (02-08-21 @ 11:02) (93% - 98%)    Physical Exam:        HEENT PERRLA EOMI    No oral exudate or erythema    Chest Good AE,CTA    CVS RRR S1 S2 WNl No murmur or rub or gallop    Abd soft BS normal No tenderness no masses    IV site no erythema tenderness or discharge    CNS AAO X 3 no focal    Lab Data:                          13.6   7.40  )-----------( 246      ( 08 Feb 2021 05:40 )             38.6       02-08    141  |  105  |  9   ----------------------------<  107<H>  3.2<L>   |  24  |  0.80    Ca    8.7      08 Feb 2021 05:38  Phos  3.6     02-08  Mg     2.2     02-08    TPro  6.8  /  Alb  4.0  /  TBili  0.5  /  DBili  x   /  AST  24  /  ALT  41  /  AlkPhos  49  02-08        Culture - Urine (collected 06 Feb 2021 20:52)  Source: .Urine Catheterized  Final Report (07 Feb 2021 20:34):    No growth    Culture - Blood (collected 06 Feb 2021 20:52)  Source: .Blood Blood-Venous  Preliminary Report (07 Feb 2021 21:01):    No growth to date.    Culture - Blood (collected 06 Feb 2021 20:52)  Source: .Blood Blood-Peripheral  Preliminary Report (07 Feb 2021 21:01):    No growth to date.    Culture - CSF with Gram Stain (collected 06 Feb 2021 20:40)  Source: .CSF CSF  Gram Stain (06 Feb 2021 21:57):    polymorphonuclear leukocytes seen    No organisms seen    by cytocentrifuge  Preliminary Report (07 Feb 2021 16:40):    No growth            Vancomycin Level, Trough: 5.3 ug/mL (02-08-21 @ 05:40)        < from: Xray Chest 1 View AP/PA (02.06.21 @ 17:21) >    IMPRESSION:    Clear lungs.          < end of copied text >      < from: CT Head No Cont (02.08.21 @ 06:22) >  IMPRESSION:    No hydrocephalus, acute intracranial hemorrhage, mass effect, or brain edema.        < end of copied text >  
Patient is a 31y old  Male who presents with a chief complaint of lethargic / vomiting / fever 101 F (09 Feb 2021 12:45)    Being followed by ID for fever    Interval history:afebrile  awake-answers queries appropriately  No acute events      ROS:  No cough,SOB,CP  No N/V/D./abd pain  No other complaints      Antimicrobials:      Other medications reviewed  MEDICATIONS  (STANDING):  buprenorphine 8 mG/naloxone 2 mG SL  Tablet 1 Tablet(s) SubLingual <User Schedule>  clonazePAM  Tablet 0.5 milliGRAM(s) Oral every 12 hours  dextrose 5% + sodium chloride 0.9%. 1000 milliLiter(s) (100 mL/Hr) IV Continuous <Continuous>  heparin   Injectable 5000 Unit(s) SubCutaneous every 8 hours  pantoprazole  Injectable 40 milliGRAM(s) IV Push every 12 hours      Vital Signs Last 24 Hrs  T(C): 37 (02-09-21 @ 11:36), Max: 37.1 (02-08-21 @ 21:40)  T(F): 98.6 (02-09-21 @ 11:36), Max: 98.7 (02-08-21 @ 21:40)  HR: 106 (02-09-21 @ 11:36) (54 - 106)  BP: 147/81 (02-09-21 @ 11:36) (124/78 - 147/81)  BP(mean): --  RR: 18 (02-09-21 @ 11:36) (18 - 18)  SpO2: 96% (02-09-21 @ 11:36) (96% - 98%)    Physical Exam:        HEENT PERRLA EOMI    No oral exudate or erythema    Chest Good AE,CTA    CVS RRR S1 S2 WNl No murmur or rub or gallop    Abd soft BS normal No tenderness no masses    IV site no erythema tenderness or discharge    CNS AAO X 3 no focal    Lab Data:                          14.4   8.01  )-----------( 282      ( 09 Feb 2021 06:18 )             40.6       02-09    142  |  106  |  8   ----------------------------<  94  3.0<L>   |  25  |  0.85    Ca    9.3      09 Feb 2021 06:18  Phos  3.3     02-09  Mg     2.4     02-09    TPro  6.8  /  Alb  4.0  /  TBili  0.5  /  DBili  x   /  AST  24  /  ALT  41  /  AlkPhos  49  02-08        Culture - Blood (collected 08 Feb 2021 03:55)  Source: .Blood Blood-Peripheral  Preliminary Report (09 Feb 2021 04:01):    No growth to date.    Culture - Blood (collected 08 Feb 2021 03:55)  Source: .Blood Blood-Peripheral  Preliminary Report (09 Feb 2021 04:01):    No growth to date.    Culture - Urine (collected 06 Feb 2021 20:52)  Source: .Urine Catheterized  Final Report (07 Feb 2021 20:34):    No growth    Culture - Blood (collected 06 Feb 2021 20:52)  Source: .Blood Blood-Venous  Preliminary Report (07 Feb 2021 21:01):    No growth to date.    Culture - Blood (collected 06 Feb 2021 20:52)  Source: .Blood Blood-Peripheral  Preliminary Report (07 Feb 2021 21:01):    No growth to date.    Culture - CSF with Gram Stain (collected 06 Feb 2021 20:40)  Source: .CSF CSF  Gram Stain (06 Feb 2021 21:57):    polymorphonuclear leukocytes seen    No organisms seen    by cytocentrifuge  Preliminary Report (07 Feb 2021 16:40):    No growth    < from: Xray Chest 1 View AP/PA (02.06.21 @ 17:21) >  IMPRESSION:    Clear lungs.          < end of copied text >        Vancomycin Level, Trough: 5.3 ug/mL (02-08-21 @ 05:40)            
Patient is a 31y old  Male who presents with a chief complaint of lethargic / vomiting / fever 101 F (09 Feb 2021 15:27)    pt. seen and examined , doing better now , aaox3     INTERVAL HPI/OVERNIGHT EVENTS:  T(C): 37 (02-09-21 @ 21:30), Max: 37.1 (02-09-21 @ 03:25)  HR: 67 (02-09-21 @ 22:57) (54 - 106)  BP: 126/80 (02-09-21 @ 22:57) (122/80 - 147/81)  RR: 19 (02-09-21 @ 22:57) (18 - 19)  SpO2: 98% (02-09-21 @ 22:57) (96% - 98%)  Wt(kg): --  I&O's Summary    08 Feb 2021 07:01  -  09 Feb 2021 07:00  --------------------------------------------------------  IN: 1540 mL / OUT: 3400 mL / NET: -1860 mL    09 Feb 2021 07:01  -  10 Feb 2021 01:39  --------------------------------------------------------  IN: 1640 mL / OUT: 1700 mL / NET: -60 mL        PAST MEDICAL & SURGICAL HISTORY:  Polysubstance (including opioids) dependence with physiological dependence    L4-L5 disc bulge        SOCIAL HISTORY  Alcohol:  Tobacco:  Illicit substance use:    FAMILY HISTORY:    REVIEW OF SYSTEMS:  CONSTITUTIONAL: No fever, weight loss, or fatigue  EYES: No eye pain, visual disturbances, or discharge  ENMT:  No difficulty hearing, tinnitus, vertigo; No sinus or throat pain  NECK: No pain or stiffness  RESPIRATORY: No cough, wheezing, chills or hemoptysis; No shortness of breath  CARDIOVASCULAR: No chest pain, palpitations, dizziness, or leg swelling  GASTROINTESTINAL: No abdominal or epigastric pain. No nausea, vomiting, or hematemesis; No diarrhea or constipation. No melena or hematochezia.  GENITOURINARY: No dysuria, frequency, hematuria, or incontinence  NEUROLOGICAL: No headaches, memory loss, loss of strength, numbness, or tremors  SKIN: No itching, burning, rashes, or lesions   LYMPH NODES: No enlarged glands  ENDOCRINE: No heat or cold intolerance; No hair loss  MUSCULOSKELETAL: No joint pain or swelling; No muscle, back, or extremity pain  PSYCHIATRIC: No depression, anxiety, mood swings, or difficulty sleeping  HEME/LYMPH: No easy bruising, or bleeding gums  ALLERY AND IMMUNOLOGIC: No hives or eczema    RADIOLOGY & ADDITIONAL TESTS:    Imaging Personally Reviewed:  [ ] YES  [ ] NO    Consultant(s) Notes Reviewed:  [ ] YES  [ ] NO    PHYSICAL EXAM:  GENERAL: NAD, well-groomed, well-developed  HEAD:  Atraumatic, Normocephalic  EYES: EOMI, PERRLA, conjunctiva and sclera clear  ENMT: No tonsillar erythema, exudates, or enlargement; Moist mucous membranes, Good dentition, No lesions  NECK: Supple, No JVD, Normal thyroid  NERVOUS SYSTEM:  Alert & Oriented X3, Good concentration; Motor Strength 5/5 B/L upper and lower extremities; DTRs 2+ intact and symmetric  CHEST/LUNG: Clear to percussion bilaterally; No rales, rhonchi, wheezing, or rubs  HEART: Regular rate and rhythm; No murmurs, rubs, or gallops  ABDOMEN: Soft, Nontender, Nondistended; Bowel sounds present  EXTREMITIES:  2+ Peripheral Pulses, No clubbing, cyanosis, or edema  LYMPH: No lymphadenopathy noted  SKIN: No rashes or lesions    LABS:                        14.4   8.01  )-----------( 282      ( 09 Feb 2021 06:18 )             40.6     02-09    142  |  106  |  8   ----------------------------<  94  3.0<L>   |  25  |  0.85    Ca    9.3      09 Feb 2021 06:18  Phos  3.3     02-09  Mg     2.4     02-09    TPro  6.8  /  Alb  4.0  /  TBili  0.5  /  DBili  x   /  AST  24  /  ALT  41  /  AlkPhos  49  02-08        CAPILLARY BLOOD GLUCOSE                MEDICATIONS  (STANDING):  buprenorphine 8 mG/naloxone 2 mG SL  Tablet 1 Tablet(s) SubLingual <User Schedule>  clonazePAM  Tablet 0.5 milliGRAM(s) Oral three times a day  dextrose 5% + sodium chloride 0.9%. 1000 milliLiter(s) (100 mL/Hr) IV Continuous <Continuous>  heparin   Injectable 5000 Unit(s) SubCutaneous every 8 hours  pantoprazole  Injectable 40 milliGRAM(s) IV Push every 12 hours    MEDICATIONS  (PRN):  ondansetron Injectable 4 milliGRAM(s) IV Push every 4 hours PRN Nausea and/or Vomiting      Care Discussed with Consultants/Other Providers [ ] YES  [ ] NO
Patient is a 31y old  Male who presents with a chief complaint of lethargic / vomiting / fever 101 F (10 Feb 2021 13:37)    doing well , aaox3     INTERVAL HPI/OVERNIGHT EVENTS:  T(C): 36.8 (02-10-21 @ 11:36), Max: 37 (02-09-21 @ 21:30)  HR: 64 (02-10-21 @ 11:36) (64 - 74)  BP: 130/82 (02-10-21 @ 11:36) (115/76 - 130/82)  RR: 18 (02-10-21 @ 11:36) (18 - 19)  SpO2: 98% (02-10-21 @ 11:36) (97% - 99%)  Wt(kg): --  I&O's Summary    09 Feb 2021 07:01  -  10 Feb 2021 07:00  --------------------------------------------------------  IN: 2880 mL / OUT: 2400 mL / NET: 480 mL    10 Feb 2021 07:01  -  10 Feb 2021 19:45  --------------------------------------------------------  IN: 500 mL / OUT: 1400 mL / NET: -900 mL        PAST MEDICAL & SURGICAL HISTORY:  Polysubstance (including opioids) dependence with physiological dependence    L4-L5 disc bulge        SOCIAL HISTORY  Alcohol:  Tobacco:  Illicit substance use:    FAMILY HISTORY:    REVIEW OF SYSTEMS:  CONSTITUTIONAL: No fever, weight loss, or fatigue  EYES: No eye pain, visual disturbances, or discharge  ENMT:  No difficulty hearing, tinnitus, vertigo; No sinus or throat pain  NECK: No pain or stiffness  RESPIRATORY: No cough, wheezing, chills or hemoptysis; No shortness of breath  CARDIOVASCULAR: No chest pain, palpitations, dizziness, or leg swelling  GASTROINTESTINAL: No abdominal or epigastric pain. No nausea, vomiting, or hematemesis; No diarrhea or constipation. No melena or hematochezia.  GENITOURINARY: No dysuria, frequency, hematuria, or incontinence  NEUROLOGICAL: No headaches, memory loss, loss of strength, numbness, or tremors  SKIN: No itching, burning, rashes, or lesions   LYMPH NODES: No enlarged glands  ENDOCRINE: No heat or cold intolerance; No hair loss  MUSCULOSKELETAL: No joint pain or swelling; No muscle, back, or extremity pain  PSYCHIATRIC: No depression, anxiety, mood swings, or difficulty sleeping  HEME/LYMPH: No easy bruising, or bleeding gums  ALLERY AND IMMUNOLOGIC: No hives or eczema    RADIOLOGY & ADDITIONAL TESTS:    Imaging Personally Reviewed:  [ ] YES  [ ] NO    Consultant(s) Notes Reviewed:  [ ] YES  [ ] NO    PHYSICAL EXAM:  GENERAL: NAD, well-groomed, well-developed  HEAD:  Atraumatic, Normocephalic  EYES: EOMI, PERRLA, conjunctiva and sclera clear  ENMT: No tonsillar erythema, exudates, or enlargement; Moist mucous membranes, Good dentition, No lesions  NECK: Supple, No JVD, Normal thyroid  NERVOUS SYSTEM:  Alert & Oriented X3, Good concentration; Motor Strength 5/5 B/L upper and lower extremities; DTRs 2+ intact and symmetric  CHEST/LUNG: Clear to percussion bilaterally; No rales, rhonchi, wheezing, or rubs  HEART: Regular rate and rhythm; No murmurs, rubs, or gallops  ABDOMEN: Soft, Nontender, Nondistended; Bowel sounds present  EXTREMITIES:  2+ Peripheral Pulses, No clubbing, cyanosis, or edema  LYMPH: No lymphadenopathy noted  SKIN: No rashes or lesions    LABS:                        14.2   8.57  )-----------( 264      ( 10 Feb 2021 07:07 )             40.2     02-10    142  |  107  |  10  ----------------------------<  88  3.4<L>   |  22  |  0.78    Ca    9.3      10 Feb 2021 07:05  Phos  4.1     02-10  Mg     2.3     02-10          CAPILLARY BLOOD GLUCOSE                MEDICATIONS  (STANDING):  buprenorphine 8 mG/naloxone 2 mG SL  Tablet 1 Tablet(s) SubLingual <User Schedule>  clonazePAM  Tablet 0.5 milliGRAM(s) Oral once  dextrose 5% + sodium chloride 0.9%. 1000 milliLiter(s) (100 mL/Hr) IV Continuous <Continuous>  heparin   Injectable 5000 Unit(s) SubCutaneous every 8 hours  pantoprazole    Tablet 40 milliGRAM(s) Oral every 12 hours    MEDICATIONS  (PRN):  ondansetron Injectable 4 milliGRAM(s) IV Push every 4 hours PRN Nausea and/or Vomiting      Care Discussed with Consultants/Other Providers [ ] YES  [ ] NO
Patient is a 31y old  Male who presents with a chief complaint of lethargic / vomiting / fever 101 F (08 Feb 2021 14:45)    pt. seen and examined, now aaox3 , talking   started on subexon , decrease klonopin     INTERVAL HPI/OVERNIGHT EVENTS:  T(C): 37.1 (02-08-21 @ 21:40), Max: 37.7 (02-08-21 @ 01:30)  HR: 58 (02-08-21 @ 21:40) (56 - 79)  BP: 128/80 (02-08-21 @ 21:40) (124/78 - 166/97)  RR: 18 (02-08-21 @ 21:40) (18 - 18)  SpO2: 98% (02-08-21 @ 21:40) (93% - 98%)  Wt(kg): --  I&O's Summary    07 Feb 2021 07:01  -  08 Feb 2021 07:00  --------------------------------------------------------  IN: 1943 mL / OUT: 2050 mL / NET: -107 mL    08 Feb 2021 07:01  -  09 Feb 2021 01:17  --------------------------------------------------------  IN: 240 mL / OUT: 2200 mL / NET: -1960 mL        PAST MEDICAL & SURGICAL HISTORY:  Polysubstance (including opioids) dependence with physiological dependence    L4-L5 disc bulge        SOCIAL HISTORY  Alcohol:  Tobacco:  Illicit substance use:    FAMILY HISTORY:    REVIEW OF SYSTEMS:  CONSTITUTIONAL: No fever, weight loss, or fatigue  EYES: No eye pain, visual disturbances, or discharge  ENMT:  No difficulty hearing, tinnitus, vertigo; No sinus or throat pain  NECK: No pain or stiffness  RESPIRATORY: No cough, wheezing, chills or hemoptysis; No shortness of breath  CARDIOVASCULAR: No chest pain, palpitations, dizziness, or leg swelling  GASTROINTESTINAL: No abdominal or epigastric pain. No nausea, vomiting, or hematemesis; No diarrhea or constipation. No melena or hematochezia.  GENITOURINARY: No dysuria, frequency, hematuria, or incontinence  NEUROLOGICAL: No headaches, memory loss, loss of strength, numbness, or tremors  SKIN: No itching, burning, rashes, or lesions   LYMPH NODES: No enlarged glands  ENDOCRINE: No heat or cold intolerance; No hair loss  MUSCULOSKELETAL: No joint pain or swelling; No muscle, back, or extremity pain  PSYCHIATRIC: No depression, anxiety, mood swings, or difficulty sleeping  HEME/LYMPH: No easy bruising, or bleeding gums  ALLERY AND IMMUNOLOGIC: No hives or eczema    RADIOLOGY & ADDITIONAL TESTS:    Imaging Personally Reviewed:  [ ] YES  [ ] NO    Consultant(s) Notes Reviewed:  [ ] YES  [ ] NO    PHYSICAL EXAM:  GENERAL: NAD, well-groomed, well-developed  HEAD:  Atraumatic, Normocephalic  EYES: EOMI, PERRLA, conjunctiva and sclera clear  ENMT: No tonsillar erythema, exudates, or enlargement; Moist mucous membranes, Good dentition, No lesions  NECK: Supple, No JVD, Normal thyroid  NERVOUS SYSTEM:  Alert & Oriented X3, Good concentration; Motor Strength 5/5 B/L upper and lower extremities; DTRs 2+ intact and symmetric  CHEST/LUNG: Clear to percussion bilaterally; No rales, rhonchi, wheezing, or rubs  HEART: Regular rate and rhythm; No murmurs, rubs, or gallops  ABDOMEN: Soft, Nontender, Nondistended; Bowel sounds present  EXTREMITIES:  2+ Peripheral Pulses, No clubbing, cyanosis, or edema  LYMPH: No lymphadenopathy noted  SKIN: No rashes or lesions    LABS:                        13.6   7.40  )-----------( 246      ( 08 Feb 2021 05:40 )             38.6     02-08    141  |  107  |  8   ----------------------------<  92  3.7   |  23  |  0.79    Ca    9.2      08 Feb 2021 14:27  Phos  3.6     02-08  Mg     2.2     02-08    TPro  6.8  /  Alb  4.0  /  TBili  0.5  /  DBili  x   /  AST  24  /  ALT  41  /  AlkPhos  49  02-08        CAPILLARY BLOOD GLUCOSE      POCT Blood Glucose.: 103 mg/dL (08 Feb 2021 05:49)    ABG - ( 07 Feb 2021 12:11 )  pH, Arterial: 7.50  pH, Blood: x     /  pCO2: 33    /  pO2: 45    / HCO3: 25    / Base Excess: 2.9   /  SaO2: 83                      MEDICATIONS  (STANDING):  buprenorphine 8 mG/naloxone 2 mG SL  Tablet 1 Tablet(s) SubLingual daily  clonazePAM  Tablet 0.5 milliGRAM(s) Oral every 12 hours  dextrose 5% + sodium chloride 0.9%. 1000 milliLiter(s) (100 mL/Hr) IV Continuous <Continuous>  heparin   Injectable 5000 Unit(s) SubCutaneous every 8 hours  pantoprazole  Injectable 40 milliGRAM(s) IV Push every 12 hours    MEDICATIONS  (PRN):  ondansetron Injectable 4 milliGRAM(s) IV Push every 4 hours PRN Nausea and/or Vomiting      Care Discussed with Consultants/Other Providers [ ] YES  [ ] NO
Patient is a 31y old  Male who presents with a chief complaint of lethargic / vomiting / fever 89 F (2021 19:52)    pt. condition same , lethargic, spiking fever, seen by ID/ neuro/ MICU   s/p ativan x 1 dose for possible seizures   still lethargic and not opening eyes   INTERVAL HPI/OVERNIGHT EVENTS:  T(C): 37.7 (21 @ 01:30), Max: 38.4 (21 @ 22:31)  HR: 61 (21 @ 01:30) (57 - 85)  BP: 166/97 (21 @ 01:30) (111/77 - 166/97)  RR: 18 (21 @ 01:30) (16 - 20)  SpO2: 98% (21 @ 01:30) (93% - 98%)  Wt(kg): --  I&O's Summary    2021 07:01  -  2021 07:00  --------------------------------------------------------  IN: 665 mL / OUT: 0 mL / NET: 665 mL    2021 07:01  -  2021 02:01  --------------------------------------------------------  IN: 1943 mL / OUT: 950 mL / NET: 993 mL        PAST MEDICAL & SURGICAL HISTORY:  Polysubstance (including opioids) dependence with physiological dependence    L4-L5 disc bulge        SOCIAL HISTORY  Alcohol:  Tobacco:  Illicit substance use:    FAMILY HISTORY:    REVIEW OF SYSTEMS:  CONSTITUTIONAL: No fever, weight loss, or fatigue  EYES: No eye pain, visual disturbances, or discharge  ENMT:  No difficulty hearing, tinnitus, vertigo; No sinus or throat pain  NECK: No pain or stiffness  RESPIRATORY: No cough, wheezing, chills or hemoptysis; No shortness of breath  CARDIOVASCULAR: No chest pain, palpitations, dizziness, or leg swelling  GASTROINTESTINAL: No abdominal or epigastric pain. No nausea, vomiting, or hematemesis; No diarrhea or constipation. No melena or hematochezia.  GENITOURINARY: No dysuria, frequency, hematuria, or incontinence  NEUROLOGICAL: No headaches, memory loss, loss of strength, numbness, or tremors  SKIN: No itching, burning, rashes, or lesions   LYMPH NODES: No enlarged glands  ENDOCRINE: No heat or cold intolerance; No hair loss  MUSCULOSKELETAL: No joint pain or swelling; No muscle, back, or extremity pain  PSYCHIATRIC: No depression, anxiety, mood swings, or difficulty sleeping  HEME/LYMPH: No easy bruising, or bleeding gums  ALLERY AND IMMUNOLOGIC: No hives or eczema    RADIOLOGY & ADDITIONAL TESTS:    Imaging Personally Reviewed:  [ ] YES  [ ] NO    Consultant(s) Notes Reviewed:  [ ] YES  [ ] NO    PHYSICAL EXAM:  GENERAL: NAD, well-groomed, well-developed  HEAD:  Atraumatic, Normocephalic  EYES: EOMI, PERRLA, conjunctiva and sclera clear  ENMT: No tonsillar erythema, exudates, or enlargement; Moist mucous membranes, Good dentition, No lesions  NECK: Supple, No JVD, Normal thyroid  NERVOUS SYSTEM:  Alert & Oriented X3, Good concentration; Motor Strength 5/5 B/L upper and lower extremities; DTRs 2+ intact and symmetric  CHEST/LUNG: Clear to percussion bilaterally; No rales, rhonchi, wheezing, or rubs  HEART: Regular rate and rhythm; No murmurs, rubs, or gallops  ABDOMEN: Soft, Nontender, Nondistended; Bowel sounds present  EXTREMITIES:  2+ Peripheral Pulses, No clubbing, cyanosis, or edema  LYMPH: No lymphadenopathy noted  SKIN: No rashes or lesions    LABS:                        14.4   8.06  )-----------( 254      ( 2021 01:35 )             40.6     02-07    141  |  105  |  10  ----------------------------<  114<H>  3.2<L>   |  25  |  0.83    Ca    8.9      2021 18:36  Phos  2.1     02-07  Mg     2.3     -07    TPro  7.3  /  Alb  4.2  /  TBili  0.6  /  DBili  0.1  /  AST  25  /  ALT  50<H>  /  AlkPhos  53  02-07      Urinalysis Basic - ( 2021 16:40 )    Color: Yellow / Appearance: Clear / S.042 / pH: x  Gluc: x / Ketone: Moderate  / Bili: Negative / Urobili: 3 mg/dL   Blood: x / Protein: 100 / Nitrite: Negative   Leuk Esterase: Negative / RBC: x / WBC x   Sq Epi: x / Non Sq Epi: x / Bacteria: x      CAPILLARY BLOOD GLUCOSE        ABG - ( 2021 12:11 )  pH, Arterial: 7.50  pH, Blood: x     /  pCO2: 33    /  pO2: 45    / HCO3: 25    / Base Excess: 2.9   /  SaO2: 83                  Urinalysis Basic - ( 2021 16:40 )    Color: Yellow / Appearance: Clear / S.042 / pH: x  Gluc: x / Ketone: Moderate  / Bili: Negative / Urobili: 3 mg/dL   Blood: x / Protein: 100 / Nitrite: Negative   Leuk Esterase: Negative / RBC: x / WBC x   Sq Epi: x / Non Sq Epi: x / Bacteria: x        MEDICATIONS  (STANDING):  cefTRIAXone   IVPB 1000 milliGRAM(s) IV Intermittent every 24 hours  clonazePAM  Tablet 1 milliGRAM(s) Oral two times a day  dextrose 5% + sodium chloride 0.9%. 1000 milliLiter(s) (100 mL/Hr) IV Continuous <Continuous>  heparin   Injectable 5000 Unit(s) SubCutaneous every 8 hours  pantoprazole  Injectable 40 milliGRAM(s) IV Push every 12 hours  sodium phosphate IVPB 15 milliMole(s) IV Intermittent once  vancomycin  IVPB 1000 milliGRAM(s) IV Intermittent every 12 hours    MEDICATIONS  (PRN):  ondansetron Injectable 4 milliGRAM(s) IV Push every 4 hours PRN Nausea and/or Vomiting      Care Discussed with Consultants/Other Providers [ ] YES  [ ] NO

## 2021-02-13 LAB
CULTURE RESULTS: SIGNIFICANT CHANGE UP
CULTURE RESULTS: SIGNIFICANT CHANGE UP
SPECIMEN SOURCE: SIGNIFICANT CHANGE UP
SPECIMEN SOURCE: SIGNIFICANT CHANGE UP

## 2021-12-09 ENCOUNTER — TRANSCRIPTION ENCOUNTER (OUTPATIENT)
Age: 31
End: 2021-12-09

## 2022-09-06 NOTE — CONSULT NOTE ADULT - REASON FOR ADMISSION
lethargic / vomiting / fever 89 F
06-Sep-2022

## 2023-12-07 ENCOUNTER — NON-APPOINTMENT (OUTPATIENT)
Age: 33
End: 2023-12-07

## 2025-01-04 ENCOUNTER — NON-APPOINTMENT (OUTPATIENT)
Age: 35
End: 2025-01-04